# Patient Record
Sex: MALE | Race: WHITE | NOT HISPANIC OR LATINO | Employment: UNEMPLOYED | URBAN - METROPOLITAN AREA
[De-identification: names, ages, dates, MRNs, and addresses within clinical notes are randomized per-mention and may not be internally consistent; named-entity substitution may affect disease eponyms.]

---

## 2022-04-03 ENCOUNTER — HOSPITAL ENCOUNTER (EMERGENCY)
Facility: HOSPITAL | Age: 52
Discharge: HOME | End: 2022-04-03
Attending: FAMILY MEDICINE

## 2022-04-03 VITALS
SYSTOLIC BLOOD PRESSURE: 158 MMHG | OXYGEN SATURATION: 100 % | HEART RATE: 71 BPM | HEIGHT: 60 IN | TEMPERATURE: 98 F | BODY MASS INDEX: 36.91 KG/M2 | WEIGHT: 188 LBS | DIASTOLIC BLOOD PRESSURE: 108 MMHG | RESPIRATION RATE: 18 BRPM

## 2022-04-03 DIAGNOSIS — N20.0 KIDNEY STONE: Primary | ICD-10-CM

## 2022-04-03 DIAGNOSIS — N13.30 HYDRONEPHROSIS, UNSPECIFIED HYDRONEPHROSIS TYPE: ICD-10-CM

## 2022-04-03 DIAGNOSIS — N13.4 HYDROURETER: ICD-10-CM

## 2022-04-03 LAB
BILIRUB UR QL STRIP: NEGATIVE
CLARITY UR: ABNORMAL
COLOR UR: YELLOW
GLUCOSE UR QL STRIP: NEGATIVE
HGB UR QL STRIP: ABNORMAL
KETONES UR QL STRIP: NEGATIVE
LEUKOCYTE ESTERASE UR QL STRIP: NEGATIVE
MICROSCOPIC COMMENT: ABNORMAL
NITRITE UR QL STRIP: NEGATIVE
PH UR STRIP: >8 [PH] (ref 5–8)
PROT UR QL STRIP: ABNORMAL
RBC #/AREA URNS HPF: >100 /HPF (ref 0–4)
SP GR UR STRIP: 1.02 (ref 1–1.03)
URN SPEC COLLECT METH UR: ABNORMAL
UROBILINOGEN UR STRIP-ACNC: 1 EU/DL

## 2022-04-03 PROCEDURE — 99285 EMERGENCY DEPT VISIT HI MDM: CPT | Mod: 25

## 2022-04-03 PROCEDURE — 74176 CT ABD & PELVIS W/O CONTRAST: CPT | Mod: TC

## 2022-04-03 PROCEDURE — 74176 CT ABD & PELVIS W/O CONTRAST: CPT | Mod: 26,,, | Performed by: RADIOLOGY

## 2022-04-03 PROCEDURE — 81000 URINALYSIS NONAUTO W/SCOPE: CPT | Performed by: NURSE PRACTITIONER

## 2022-04-03 PROCEDURE — 63600175 PHARM REV CODE 636 W HCPCS: Performed by: NURSE PRACTITIONER

## 2022-04-03 PROCEDURE — 25000003 PHARM REV CODE 250: Performed by: NURSE PRACTITIONER

## 2022-04-03 PROCEDURE — 96372 THER/PROPH/DIAG INJ SC/IM: CPT | Performed by: NURSE PRACTITIONER

## 2022-04-03 PROCEDURE — 74176 CT RENAL STONE STUDY ABD PELVIS WO: ICD-10-PCS | Mod: 26,,, | Performed by: RADIOLOGY

## 2022-04-03 RX ORDER — HYDROCODONE BITARTRATE AND ACETAMINOPHEN 10; 325 MG/1; MG/1
1 TABLET ORAL
Status: COMPLETED | OUTPATIENT
Start: 2022-04-03 | End: 2022-04-03

## 2022-04-03 RX ORDER — TAMSULOSIN HYDROCHLORIDE 0.4 MG/1
0.4 CAPSULE ORAL DAILY
Qty: 10 CAPSULE | Refills: 0 | Status: SHIPPED | OUTPATIENT
Start: 2022-04-03

## 2022-04-03 RX ORDER — HYDROCODONE BITARTRATE AND ACETAMINOPHEN 10; 325 MG/1; MG/1
1 TABLET ORAL EVERY 8 HOURS PRN
Qty: 10 TABLET | Refills: 0 | Status: SHIPPED | OUTPATIENT
Start: 2022-04-03

## 2022-04-03 RX ORDER — CIPROFLOXACIN 500 MG/1
500 TABLET ORAL 2 TIMES DAILY
Qty: 20 TABLET | Refills: 0 | Status: SHIPPED | OUTPATIENT
Start: 2022-04-03 | End: 2022-04-13

## 2022-04-03 RX ORDER — KETOROLAC TROMETHAMINE 30 MG/ML
60 INJECTION, SOLUTION INTRAMUSCULAR; INTRAVENOUS
Status: COMPLETED | OUTPATIENT
Start: 2022-04-03 | End: 2022-04-03

## 2022-04-03 RX ADMIN — KETOROLAC TROMETHAMINE 60 MG: 30 INJECTION, SOLUTION INTRAMUSCULAR at 12:04

## 2022-04-03 RX ADMIN — HYDROCODONE BITARTRATE AND ACETAMINOPHEN 1 TABLET: 10; 325 TABLET ORAL at 01:04

## 2022-04-03 NOTE — ED PROVIDER NOTES
Encounter Date: 4/3/2022       History     Chief Complaint   Patient presents with    Back Pain     Left flank pain states that he has a kidney stone     Comes in compaining of left flank pain.  has a history of kidney stones.  has been in this hospital in the past for kidney stones. Pain is rated as 10/10 currently        Review of patient's allergies indicates:   Allergen Reactions    Penicillins Swelling     Past Medical History:   Diagnosis Date    Renal disorder      History reviewed. No pertinent surgical history.  History reviewed. No pertinent family history.  Social History     Tobacco Use    Smoking status: Current Every Day Smoker    Smokeless tobacco: Never Used   Substance Use Topics    Alcohol use: Not Currently    Drug use: Not Currently     Types: Cocaine, Methamphetamines     Review of Systems   Constitutional: Negative for fever.   HENT: Negative for sore throat.    Respiratory: Negative for shortness of breath.    Cardiovascular: Negative for chest pain.   Gastrointestinal: Negative for nausea.   Genitourinary: Positive for flank pain. Negative for dysuria.   Musculoskeletal: Negative for back pain.   Skin: Negative for rash.   Neurological: Negative for weakness.   Hematological: Does not bruise/bleed easily.       Physical Exam     Initial Vitals [04/03/22 1144]   BP Pulse Resp Temp SpO2   (!) 158/108 71 18 97.8 °F (36.6 °C) 100 %      MAP       --         Physical Exam    Nursing note and vitals reviewed.  Constitutional: He appears well-developed and well-nourished.   HENT:   Head: Normocephalic and atraumatic.   Eyes: EOM are normal.   Neck: Neck supple.   Normal range of motion.  Cardiovascular: Normal rate and regular rhythm.   Pulmonary/Chest: Breath sounds normal. He has no wheezes. He has no rhonchi.   Abdominal: Abdomen is soft. There is no abdominal tenderness.   Musculoskeletal:         General: Normal range of motion.      Cervical back: Normal range of motion and  neck supple.     Lymphadenopathy:     He has no cervical adenopathy.   Neurological: He is alert and oriented to person, place, and time.   Skin: Skin is warm and dry. Capillary refill takes less than 2 seconds.   Psychiatric: He has a normal mood and affect. Thought content normal.         ED Course   Procedures  Labs Reviewed   URINALYSIS, REFLEX TO URINE CULTURE - Abnormal; Notable for the following components:       Result Value    Appearance, UA Hazy (*)     pH, UA >8.0 (*)     Protein, UA Trace (*)     Occult Blood UA 3+ (*)     All other components within normal limits    Narrative:     Preferred Collection Type->Urine, Clean Catch  Specimen Source->Urine   URINALYSIS MICROSCOPIC - Abnormal; Notable for the following components:    RBC, UA >100 (*)     All other components within normal limits    Narrative:     Preferred Collection Type->Urine, Clean Catch  Specimen Source->Urine          Imaging Results          CT Renal Stone Study ABD Pelvis WO (Final result)  Result time 04/03/22 12:56:27    Final result by Pili Ferrell MD (04/03/22 12:56:27)                 Impression:      1. 7 mm distal left ureteral stone with associated diffuse left hydroureter and moderate left hydronephrosis.  2. bilateral nephrolithiasis  3. Diffuse bladder wall thickening which could reflect underlying cystitis.  4. Other findings as detailed above      Electronically signed by: Mahesh Ferrell MD  Date:    04/03/2022  Time:    12:56             Narrative:    EXAMINATION:  CT RENAL STONE STUDY ABD PELVIS WO    CLINICAL HISTORY:  Flank pain, kidney stone suspected;    TECHNIQUE:  3 mm contiguous low dose axial images were acquired through the abdomen and pelvis utilizing the CT renal stone study protocol.  No oral contrast material was administered.    COMPARISON:  CT renal stone study-05/25/2016.    FINDINGS:  Kidneys, ureters, and bladder: There is a 7 mm distal left ureteral stone with associated diffuse left hydroureter  and moderate left hydronephrosis.  There is asymmetric enlargement of the left kidney and perinephric fat stranding due to inflammatory change of the left kidney.  There is bilateral nephrolithiasis present.  The largest stone in the left kidney measures 7 mm in the left renal midpole on series 2, image 62.  The largest stone in the right kidney measures 5 mm in the lower pole of the right kidney on series 2, image 59.  no definite solid mass within either kidney.  No right hydronephrosis.  The right ureter is normal in caliber and course without stones.  There is diffuse bladder wall thickening present suggesting possible cystitis.  The prostate gland is normal in size.  There is a tiny fat containing left inguinal hernia.    Lung bases and mediastinum: The visualized lung bases are unremarkable.  There is a small hiatal hernia present..    Remaining soft tissues of the abdomen and pelvis: The liver, gallbladder, duodenal C-loop, pancreas, and adrenal glands are unremarkable.  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  There is atherosclerotic calcification present within the abdominal aorta.  No abdominal aortic aneurysm.  No bulky periaortic or retroperitoneal lymphadenopathy.    The appendix is not definitively identified.  No inflammatory changes noted in the vicinity of the cecum.  There is a suggestion of possible concentric rectal wall thickening (series 2, image 144).  This may be due to nondistention and appears similar in extent when compared with 05/25/2016.  No definite bowel inflammatory change or high-grade bowel obstruction.  No ascites or pneumoperitoneum.  No intra-abdominal abscess formation.  No inguinal lymphadenopathy.    Bones: There is multilevel degenerative change of the lumbar spine in the form of marginal osteophyte formation, disc space narrowing, and degenerative facet  arthropathy.  There is chondrocalcinosis present in both hips compatible with underlying CPPD.  There is rim osteophyte formation about the left and right femoral neck and there is moderate-severe hip joint space narrowing noted bilaterally.  There are bone islands present in both femoral heads.  No osteonecrosis of the femoral heads..                                 Medications   ketorolac injection 60 mg (60 mg Intramuscular Given 4/3/22 1236)   HYDROcodone-acetaminophen  mg per tablet 1 tablet (1 tablet Oral Given 4/3/22 1343)                 ED Course as of 04/03/22 1353   Sun Apr 03, 2022   1315 Discussed with Dr Leung [NP]      ED Course User Index  [NP] SEBASTIAN Armando             Clinical Impression:   Final diagnoses:  [N20.0] Kidney stone (Primary)  [N13.4] Hydroureter  [N13.30] Hydronephrosis, unspecified hydronephrosis type          ED Disposition Condition    Discharge Good        ED Prescriptions     Medication Sig Dispense Start Date End Date Auth. Provider    tamsulosin (FLOMAX) 0.4 mg Cap Take 1 capsule (0.4 mg total) by mouth once daily. 10 capsule 4/3/2022  SEBASTIAN Armando    HYDROcodone-acetaminophen (NORCO)  mg per tablet Take 1 tablet by mouth every 8 (eight) hours as needed for Pain. 10 tablet 4/3/2022  SEBASTIAN Armando    ciprofloxacin HCl (CIPRO) 500 MG tablet Take 1 tablet (500 mg total) by mouth 2 (two) times daily. for 10 days 20 tablet 4/3/2022 4/13/2022 SEBASTIAN Armando        Follow-up Information     Follow up With Specialties Details Why Contact Info    PCP   Schedule appointment            SEBASTIAN Armando  04/03/22 1902

## 2024-01-25 ENCOUNTER — HOSPITAL ENCOUNTER (INPATIENT)
Facility: HOSPITAL | Age: 54
LOS: 1 days | Discharge: ANOTHER HOSPITAL | DRG: 291 | End: 2024-01-27
Attending: EMERGENCY MEDICINE | Admitting: STUDENT IN AN ORGANIZED HEALTH CARE EDUCATION/TRAINING PROGRAM
Payer: COMMERCIAL

## 2024-01-25 DIAGNOSIS — R06.02 SOB (SHORTNESS OF BREATH): ICD-10-CM

## 2024-01-25 DIAGNOSIS — I21.4 NSTEMI (NON-ST ELEVATED MYOCARDIAL INFARCTION): ICD-10-CM

## 2024-01-25 DIAGNOSIS — I50.9 ACUTE CONGESTIVE HEART FAILURE, UNSPECIFIED HEART FAILURE TYPE: Primary | ICD-10-CM

## 2024-01-25 DIAGNOSIS — R06.02 SHORTNESS OF BREATH: ICD-10-CM

## 2024-01-25 PROBLEM — E87.70 VOLUME OVERLOAD: Status: ACTIVE | Noted: 2024-01-25

## 2024-01-25 LAB
ALBUMIN SERPL BCP-MCNC: 3.9 G/DL (ref 3.5–5.2)
ALP SERPL-CCNC: 104 U/L (ref 55–135)
ALT SERPL W/O P-5'-P-CCNC: 22 U/L (ref 10–44)
AMPHET+METHAMPHET UR QL: ABNORMAL
ANION GAP SERPL CALC-SCNC: 11 MMOL/L (ref 8–16)
AST SERPL-CCNC: 24 U/L (ref 10–40)
BACTERIA #/AREA URNS HPF: ABNORMAL /HPF
BARBITURATES UR QL SCN>200 NG/ML: NEGATIVE
BASOPHILS # BLD AUTO: 0.16 K/UL (ref 0–0.2)
BASOPHILS NFR BLD: 1.7 % (ref 0–1.9)
BENZODIAZ UR QL SCN>200 NG/ML: NEGATIVE
BILIRUB SERPL-MCNC: 0.8 MG/DL (ref 0.1–1)
BILIRUB UR QL STRIP: NEGATIVE
BNP SERPL-MCNC: 1854 PG/ML (ref 0–99)
BNP SERPL-MCNC: 1854 PG/ML (ref 0–99)
BUN SERPL-MCNC: 13 MG/DL (ref 6–20)
BZE UR QL SCN: NEGATIVE
CALCIUM SERPL-MCNC: 9.2 MG/DL (ref 8.7–10.5)
CANNABINOIDS UR QL SCN: NEGATIVE
CHLORIDE SERPL-SCNC: 102 MMOL/L (ref 95–110)
CLARITY UR: CLEAR
CO2 SERPL-SCNC: 24 MMOL/L (ref 23–29)
COLOR UR: YELLOW
CREAT SERPL-MCNC: 1.1 MG/DL (ref 0.5–1.4)
CREAT UR-MCNC: 135.1 MG/DL (ref 23–375)
D DIMER PPP IA.FEU-MCNC: 0.83 MG/L FEU
DIFFERENTIAL METHOD BLD: NORMAL
EOSINOPHIL # BLD AUTO: 0.5 K/UL (ref 0–0.5)
EOSINOPHIL NFR BLD: 5.1 % (ref 0–8)
ERYTHROCYTE [DISTWIDTH] IN BLOOD BY AUTOMATED COUNT: 14.1 % (ref 11.5–14.5)
EST. GFR  (NO RACE VARIABLE): >60 ML/MIN/1.73 M^2
GLUCOSE SERPL-MCNC: 157 MG/DL (ref 70–110)
GLUCOSE UR QL STRIP: NEGATIVE
HCT VFR BLD AUTO: 49.2 % (ref 40–54)
HCV AB SERPL QL IA: REACTIVE
HGB BLD-MCNC: 17 G/DL (ref 14–18)
HGB UR QL STRIP: ABNORMAL
HIV 1+2 AB+HIV1 P24 AG SERPL QL IA: NORMAL
HYALINE CASTS #/AREA URNS LPF: 0 /LPF
IMM GRANULOCYTES # BLD AUTO: 0.03 K/UL (ref 0–0.04)
IMM GRANULOCYTES NFR BLD AUTO: 0.3 % (ref 0–0.5)
INFLUENZA A, MOLECULAR: NEGATIVE
INFLUENZA B, MOLECULAR: NEGATIVE
KETONES UR QL STRIP: NEGATIVE
LACTATE SERPL-SCNC: 1.9 MMOL/L (ref 0.5–2.2)
LEUKOCYTE ESTERASE UR QL STRIP: NEGATIVE
LYMPHOCYTES # BLD AUTO: 2.4 K/UL (ref 1–4.8)
LYMPHOCYTES NFR BLD: 24.6 % (ref 18–48)
MCH RBC QN AUTO: 30.3 PG (ref 27–31)
MCHC RBC AUTO-ENTMCNC: 34.6 G/DL (ref 32–36)
MCV RBC AUTO: 88 FL (ref 82–98)
METHADONE UR QL SCN>300 NG/ML: NEGATIVE
MICROSCOPIC COMMENT: ABNORMAL
MONOCYTES # BLD AUTO: 0.8 K/UL (ref 0.3–1)
MONOCYTES NFR BLD: 8 % (ref 4–15)
NEUTROPHILS # BLD AUTO: 5.9 K/UL (ref 1.8–7.7)
NEUTROPHILS NFR BLD: 60.3 % (ref 38–73)
NITRITE UR QL STRIP: NEGATIVE
NRBC BLD-RTO: 0 /100 WBC
OPIATES UR QL SCN: NEGATIVE
PCP UR QL SCN>25 NG/ML: NEGATIVE
PH UR STRIP: 6 [PH] (ref 5–8)
PLATELET # BLD AUTO: 344 K/UL (ref 150–450)
PMV BLD AUTO: 9.3 FL (ref 9.2–12.9)
POTASSIUM SERPL-SCNC: 4.6 MMOL/L (ref 3.5–5.1)
PROT SERPL-MCNC: 7.7 G/DL (ref 6–8.4)
PROT UR QL STRIP: ABNORMAL
RBC # BLD AUTO: 5.61 M/UL (ref 4.6–6.2)
RBC #/AREA URNS HPF: 10 /HPF (ref 0–4)
SARS-COV-2 RDRP RESP QL NAA+PROBE: NEGATIVE
SODIUM SERPL-SCNC: 137 MMOL/L (ref 136–145)
SP GR UR STRIP: 1.02 (ref 1–1.03)
SPECIMEN SOURCE: NORMAL
SQUAMOUS #/AREA URNS HPF: 1 /HPF
TOXICOLOGY INFORMATION: ABNORMAL
TROPONIN I SERPL DL<=0.01 NG/ML-MCNC: 0.17 NG/ML (ref 0–0.03)
TROPONIN I SERPL DL<=0.01 NG/ML-MCNC: 0.19 NG/ML (ref 0–0.03)
TROPONIN I SERPL DL<=0.01 NG/ML-MCNC: 0.21 NG/ML (ref 0–0.03)
URN SPEC COLLECT METH UR: ABNORMAL
UROBILINOGEN UR STRIP-ACNC: 1 EU/DL
WBC # BLD AUTO: 9.68 K/UL (ref 3.9–12.7)
WBC #/AREA URNS HPF: 2 /HPF (ref 0–5)

## 2024-01-25 PROCEDURE — 81000 URINALYSIS NONAUTO W/SCOPE: CPT | Mod: 59 | Performed by: EMERGENCY MEDICINE

## 2024-01-25 PROCEDURE — G0378 HOSPITAL OBSERVATION PER HR: HCPCS

## 2024-01-25 PROCEDURE — U0002 COVID-19 LAB TEST NON-CDC: HCPCS | Performed by: EMERGENCY MEDICINE

## 2024-01-25 PROCEDURE — 25000003 PHARM REV CODE 250: Performed by: EMERGENCY MEDICINE

## 2024-01-25 PROCEDURE — 93010 ELECTROCARDIOGRAM REPORT: CPT | Mod: ,,, | Performed by: INTERNAL MEDICINE

## 2024-01-25 PROCEDURE — 80053 COMPREHEN METABOLIC PANEL: CPT | Performed by: EMERGENCY MEDICINE

## 2024-01-25 PROCEDURE — 99285 EMERGENCY DEPT VISIT HI MDM: CPT | Mod: 25

## 2024-01-25 PROCEDURE — 71275 CT ANGIOGRAPHY CHEST: CPT | Mod: TC

## 2024-01-25 PROCEDURE — 99223 1ST HOSP IP/OBS HIGH 75: CPT | Mod: GT,,, | Performed by: HOSPITALIST

## 2024-01-25 PROCEDURE — 96374 THER/PROPH/DIAG INJ IV PUSH: CPT | Mod: 59

## 2024-01-25 PROCEDURE — 85025 COMPLETE CBC W/AUTO DIFF WBC: CPT | Performed by: EMERGENCY MEDICINE

## 2024-01-25 PROCEDURE — 71045 X-RAY EXAM CHEST 1 VIEW: CPT | Mod: TC

## 2024-01-25 PROCEDURE — 93005 ELECTROCARDIOGRAM TRACING: CPT

## 2024-01-25 PROCEDURE — 96372 THER/PROPH/DIAG INJ SC/IM: CPT | Mod: 59 | Performed by: EMERGENCY MEDICINE

## 2024-01-25 PROCEDURE — 71275 CT ANGIOGRAPHY CHEST: CPT | Mod: 26,,, | Performed by: RADIOLOGY

## 2024-01-25 PROCEDURE — 84484 ASSAY OF TROPONIN QUANT: CPT | Mod: 91 | Performed by: EMERGENCY MEDICINE

## 2024-01-25 PROCEDURE — 83605 ASSAY OF LACTIC ACID: CPT | Performed by: EMERGENCY MEDICINE

## 2024-01-25 PROCEDURE — 63600175 PHARM REV CODE 636 W HCPCS: Performed by: EMERGENCY MEDICINE

## 2024-01-25 PROCEDURE — 25500020 PHARM REV CODE 255: Performed by: EMERGENCY MEDICINE

## 2024-01-25 PROCEDURE — 86803 HEPATITIS C AB TEST: CPT | Performed by: EMERGENCY MEDICINE

## 2024-01-25 PROCEDURE — 87502 INFLUENZA DNA AMP PROBE: CPT | Performed by: EMERGENCY MEDICINE

## 2024-01-25 PROCEDURE — 87389 HIV-1 AG W/HIV-1&-2 AB AG IA: CPT | Performed by: EMERGENCY MEDICINE

## 2024-01-25 PROCEDURE — 93010 ELECTROCARDIOGRAM REPORT: CPT | Mod: 76,,, | Performed by: INTERNAL MEDICINE

## 2024-01-25 PROCEDURE — 84484 ASSAY OF TROPONIN QUANT: CPT | Performed by: EMERGENCY MEDICINE

## 2024-01-25 PROCEDURE — 71045 X-RAY EXAM CHEST 1 VIEW: CPT | Mod: 26,,, | Performed by: RADIOLOGY

## 2024-01-25 PROCEDURE — 80307 DRUG TEST PRSMV CHEM ANLYZR: CPT | Performed by: EMERGENCY MEDICINE

## 2024-01-25 PROCEDURE — 83880 ASSAY OF NATRIURETIC PEPTIDE: CPT | Performed by: EMERGENCY MEDICINE

## 2024-01-25 RX ORDER — IPRATROPIUM BROMIDE AND ALBUTEROL SULFATE 2.5; .5 MG/3ML; MG/3ML
3 SOLUTION RESPIRATORY (INHALATION) EVERY 4 HOURS PRN
Status: DISCONTINUED | OUTPATIENT
Start: 2024-01-25 | End: 2024-01-27 | Stop reason: HOSPADM

## 2024-01-25 RX ORDER — FUROSEMIDE 10 MG/ML
40 INJECTION INTRAMUSCULAR; INTRAVENOUS 2 TIMES DAILY
Status: DISCONTINUED | OUTPATIENT
Start: 2024-01-26 | End: 2024-01-27 | Stop reason: HOSPADM

## 2024-01-25 RX ORDER — FUROSEMIDE 10 MG/ML
60 INJECTION INTRAMUSCULAR; INTRAVENOUS
Status: COMPLETED | OUTPATIENT
Start: 2024-01-25 | End: 2024-01-25

## 2024-01-25 RX ORDER — LISINOPRIL 10 MG/1
10 TABLET ORAL
Status: COMPLETED | OUTPATIENT
Start: 2024-01-25 | End: 2024-01-25

## 2024-01-25 RX ORDER — ENOXAPARIN SODIUM 100 MG/ML
1 INJECTION SUBCUTANEOUS
Status: COMPLETED | OUTPATIENT
Start: 2024-01-25 | End: 2024-01-25

## 2024-01-25 RX ORDER — ENOXAPARIN SODIUM 100 MG/ML
40 INJECTION SUBCUTANEOUS EVERY 24 HOURS
Status: DISCONTINUED | OUTPATIENT
Start: 2024-01-25 | End: 2024-01-26

## 2024-01-25 RX ORDER — CARVEDILOL 3.12 MG/1
3.12 TABLET ORAL 2 TIMES DAILY
Status: DISCONTINUED | OUTPATIENT
Start: 2024-01-25 | End: 2024-01-27 | Stop reason: HOSPADM

## 2024-01-25 RX ORDER — SODIUM CHLORIDE 0.9 % (FLUSH) 0.9 %
10 SYRINGE (ML) INJECTION
Status: DISCONTINUED | OUTPATIENT
Start: 2024-01-25 | End: 2024-01-27 | Stop reason: HOSPADM

## 2024-01-25 RX ORDER — ASPIRIN 325 MG
325 TABLET ORAL
Status: DISPENSED | OUTPATIENT
Start: 2024-01-25 | End: 2024-01-26

## 2024-01-25 RX ADMIN — LISINOPRIL 10 MG: 10 TABLET ORAL at 04:01

## 2024-01-25 RX ADMIN — ENOXAPARIN SODIUM 90 MG: 100 INJECTION SUBCUTANEOUS at 04:01

## 2024-01-25 RX ADMIN — CARVEDILOL 3.12 MG: 3.12 TABLET, FILM COATED ORAL at 08:01

## 2024-01-25 RX ADMIN — IOHEXOL 100 ML: 350 INJECTION, SOLUTION INTRAVENOUS at 05:01

## 2024-01-25 RX ADMIN — NITROGLYCERIN 1 INCH: 20 OINTMENT TOPICAL at 04:01

## 2024-01-25 RX ADMIN — FUROSEMIDE 60 MG: 10 INJECTION, SOLUTION INTRAVENOUS at 04:01

## 2024-01-26 PROBLEM — I20.0 UNSTABLE ANGINA: Status: ACTIVE | Noted: 2024-01-26

## 2024-01-26 PROBLEM — Z72.0 TOBACCO ABUSE: Status: ACTIVE | Noted: 2024-01-26

## 2024-01-26 PROBLEM — F15.10 METHAMPHETAMINE USE: Status: ACTIVE | Noted: 2024-01-26

## 2024-01-26 PROBLEM — I10 HYPERTENSION: Status: ACTIVE | Noted: 2024-01-26

## 2024-01-26 PROBLEM — J44.9 COPD (CHRONIC OBSTRUCTIVE PULMONARY DISEASE): Status: ACTIVE | Noted: 2024-01-26

## 2024-01-26 PROBLEM — I50.9 ACUTE HEART FAILURE: Status: ACTIVE | Noted: 2024-01-26

## 2024-01-26 PROBLEM — E87.70 VOLUME OVERLOAD: Status: RESOLVED | Noted: 2024-01-25 | Resolved: 2024-01-26

## 2024-01-26 LAB
ANION GAP SERPL CALC-SCNC: 12 MMOL/L (ref 8–16)
BASOPHILS # BLD AUTO: 0.15 K/UL (ref 0–0.2)
BASOPHILS NFR BLD: 1.5 % (ref 0–1.9)
BUN SERPL-MCNC: 13 MG/DL (ref 6–20)
CALCIUM SERPL-MCNC: 8.8 MG/DL (ref 8.7–10.5)
CHLORIDE SERPL-SCNC: 105 MMOL/L (ref 95–110)
CO2 SERPL-SCNC: 22 MMOL/L (ref 23–29)
CREAT SERPL-MCNC: 0.9 MG/DL (ref 0.5–1.4)
DIFFERENTIAL METHOD BLD: ABNORMAL
EOSINOPHIL # BLD AUTO: 0.7 K/UL (ref 0–0.5)
EOSINOPHIL NFR BLD: 7.3 % (ref 0–8)
ERYTHROCYTE [DISTWIDTH] IN BLOOD BY AUTOMATED COUNT: 13.9 % (ref 11.5–14.5)
EST. GFR  (NO RACE VARIABLE): >60 ML/MIN/1.73 M^2
ESTIMATED AVG GLUCOSE: 108 MG/DL (ref 68–131)
GLUCOSE SERPL-MCNC: 99 MG/DL (ref 70–110)
HBA1C MFR BLD: 5.4 % (ref 4–5.6)
HCT VFR BLD AUTO: 45.7 % (ref 40–54)
HGB BLD-MCNC: 16.2 G/DL (ref 14–18)
IMM GRANULOCYTES # BLD AUTO: 0.01 K/UL (ref 0–0.04)
IMM GRANULOCYTES NFR BLD AUTO: 0.1 % (ref 0–0.5)
LYMPHOCYTES # BLD AUTO: 2.5 K/UL (ref 1–4.8)
LYMPHOCYTES NFR BLD: 25.5 % (ref 18–48)
MAGNESIUM SERPL-MCNC: 1.7 MG/DL (ref 1.6–2.6)
MCH RBC QN AUTO: 30.4 PG (ref 27–31)
MCHC RBC AUTO-ENTMCNC: 35.4 G/DL (ref 32–36)
MCV RBC AUTO: 86 FL (ref 82–98)
MONOCYTES # BLD AUTO: 0.9 K/UL (ref 0.3–1)
MONOCYTES NFR BLD: 9.3 % (ref 4–15)
NEUTROPHILS # BLD AUTO: 5.5 K/UL (ref 1.8–7.7)
NEUTROPHILS NFR BLD: 56.3 % (ref 38–73)
NRBC BLD-RTO: 0 /100 WBC
PHOSPHATE SERPL-MCNC: 4.3 MG/DL (ref 2.7–4.5)
PLATELET # BLD AUTO: 366 K/UL (ref 150–450)
PMV BLD AUTO: 9.5 FL (ref 9.2–12.9)
POTASSIUM SERPL-SCNC: 3.9 MMOL/L (ref 3.5–5.1)
RBC # BLD AUTO: 5.33 M/UL (ref 4.6–6.2)
SODIUM SERPL-SCNC: 139 MMOL/L (ref 136–145)
TROPONIN I SERPL DL<=0.01 NG/ML-MCNC: 0.19 NG/ML (ref 0–0.03)
WBC # BLD AUTO: 9.69 K/UL (ref 3.9–12.7)

## 2024-01-26 PROCEDURE — 25000003 PHARM REV CODE 250: Performed by: EMERGENCY MEDICINE

## 2024-01-26 PROCEDURE — 99900035 HC TECH TIME PER 15 MIN (STAT)

## 2024-01-26 PROCEDURE — 84484 ASSAY OF TROPONIN QUANT: CPT | Performed by: HOSPITALIST

## 2024-01-26 PROCEDURE — 99233 SBSQ HOSP IP/OBS HIGH 50: CPT | Mod: ,,, | Performed by: STUDENT IN AN ORGANIZED HEALTH CARE EDUCATION/TRAINING PROGRAM

## 2024-01-26 PROCEDURE — 83735 ASSAY OF MAGNESIUM: CPT | Performed by: HOSPITALIST

## 2024-01-26 PROCEDURE — 25000242 PHARM REV CODE 250 ALT 637 W/ HCPCS: Performed by: HOSPITALIST

## 2024-01-26 PROCEDURE — 25000003 PHARM REV CODE 250: Performed by: STUDENT IN AN ORGANIZED HEALTH CARE EDUCATION/TRAINING PROGRAM

## 2024-01-26 PROCEDURE — 94761 N-INVAS EAR/PLS OXIMETRY MLT: CPT

## 2024-01-26 PROCEDURE — 94640 AIRWAY INHALATION TREATMENT: CPT

## 2024-01-26 PROCEDURE — 96365 THER/PROPH/DIAG IV INF INIT: CPT

## 2024-01-26 PROCEDURE — 63600175 PHARM REV CODE 636 W HCPCS: Performed by: STUDENT IN AN ORGANIZED HEALTH CARE EDUCATION/TRAINING PROGRAM

## 2024-01-26 PROCEDURE — 96376 TX/PRO/DX INJ SAME DRUG ADON: CPT

## 2024-01-26 PROCEDURE — 80048 BASIC METABOLIC PNL TOTAL CA: CPT | Performed by: HOSPITALIST

## 2024-01-26 PROCEDURE — 93010 ELECTROCARDIOGRAM REPORT: CPT | Mod: ,,, | Performed by: INTERNAL MEDICINE

## 2024-01-26 PROCEDURE — 84100 ASSAY OF PHOSPHORUS: CPT | Performed by: HOSPITALIST

## 2024-01-26 PROCEDURE — 93005 ELECTROCARDIOGRAM TRACING: CPT

## 2024-01-26 PROCEDURE — 85025 COMPLETE CBC W/AUTO DIFF WBC: CPT | Performed by: HOSPITALIST

## 2024-01-26 PROCEDURE — 63600175 PHARM REV CODE 636 W HCPCS: Performed by: HOSPITALIST

## 2024-01-26 PROCEDURE — 83036 HEMOGLOBIN GLYCOSYLATED A1C: CPT | Performed by: HOSPITALIST

## 2024-01-26 PROCEDURE — 11000001 HC ACUTE MED/SURG PRIVATE ROOM

## 2024-01-26 RX ORDER — LISINOPRIL 2.5 MG/1
2.5 TABLET ORAL DAILY
Status: DISCONTINUED | OUTPATIENT
Start: 2024-01-26 | End: 2024-01-27 | Stop reason: HOSPADM

## 2024-01-26 RX ORDER — MAGNESIUM SULFATE 1 G/100ML
1 INJECTION INTRAVENOUS ONCE
Status: COMPLETED | OUTPATIENT
Start: 2024-01-26 | End: 2024-01-26

## 2024-01-26 RX ORDER — NAPROXEN SODIUM 220 MG/1
81 TABLET, FILM COATED ORAL DAILY
Status: DISCONTINUED | OUTPATIENT
Start: 2024-01-26 | End: 2024-01-27 | Stop reason: HOSPADM

## 2024-01-26 RX ORDER — POTASSIUM CHLORIDE 20 MEQ/1
40 TABLET, EXTENDED RELEASE ORAL ONCE
Status: COMPLETED | OUTPATIENT
Start: 2024-01-26 | End: 2024-01-26

## 2024-01-26 RX ORDER — ENOXAPARIN SODIUM 100 MG/ML
1 INJECTION SUBCUTANEOUS EVERY 12 HOURS
Status: DISCONTINUED | OUTPATIENT
Start: 2024-01-26 | End: 2024-01-27 | Stop reason: HOSPADM

## 2024-01-26 RX ADMIN — MAGNESIUM SULFATE IN DEXTROSE 1 G: 10 INJECTION, SOLUTION INTRAVENOUS at 08:01

## 2024-01-26 RX ADMIN — ASPIRIN 81 MG: 81 TABLET, CHEWABLE ORAL at 08:01

## 2024-01-26 RX ADMIN — POTASSIUM CHLORIDE 40 MEQ: 1500 TABLET, EXTENDED RELEASE ORAL at 08:01

## 2024-01-26 RX ADMIN — LISINOPRIL 2.5 MG: 2.5 TABLET ORAL at 08:01

## 2024-01-26 RX ADMIN — FUROSEMIDE 40 MG: 10 INJECTION, SOLUTION INTRAVENOUS at 04:01

## 2024-01-26 RX ADMIN — IPRATROPIUM BROMIDE AND ALBUTEROL SULFATE 3 ML: .5; 2.5 SOLUTION RESPIRATORY (INHALATION) at 10:01

## 2024-01-26 RX ADMIN — ENOXAPARIN SODIUM 80 MG: 80 INJECTION SUBCUTANEOUS at 09:01

## 2024-01-26 RX ADMIN — CARVEDILOL 3.12 MG: 3.12 TABLET, FILM COATED ORAL at 09:01

## 2024-01-26 RX ADMIN — CARVEDILOL 3.12 MG: 3.12 TABLET, FILM COATED ORAL at 08:01

## 2024-01-26 RX ADMIN — FUROSEMIDE 40 MG: 10 INJECTION, SOLUTION INTRAVENOUS at 09:01

## 2024-01-26 RX ADMIN — ENOXAPARIN SODIUM 80 MG: 80 INJECTION SUBCUTANEOUS at 10:01

## 2024-01-26 NOTE — HPI
The patient is a 52 y/o male with no known PMH who presents with SOB. SOB has been present over the past week and has been worsening. He has BEAUCHAMP and orthopnea. No known history of lung or hear problems. No swelling of the extremities or abdomen, CP, nausea, vomiting or other symptoms. Work up in the ER suggests new CHF. He reports feeling improved after being given lasix and nebs.

## 2024-01-26 NOTE — PLAN OF CARE
Problem: Adult Inpatient Plan of Care  Goal: Plan of Care Review  1/26/2024 0050 by Jodi Fermin RN  Outcome: Ongoing, Progressing  1/26/2024 0048 by Jodi Fermin RN  Outcome: Ongoing, Progressing  Goal: Patient-Specific Goal (Individualized)  1/26/2024 0050 by Jodi Fermin RN  Outcome: Ongoing, Progressing  1/26/2024 0048 by Jodi Fermin RN  Outcome: Ongoing, Progressing  Goal: Absence of Hospital-Acquired Illness or Injury  1/26/2024 0050 by Jodi Fermin RN  Outcome: Ongoing, Progressing  1/26/2024 0048 by Jodi Fermin RN  Outcome: Ongoing, Progressing  Goal: Optimal Comfort and Wellbeing  1/26/2024 0050 by Jodi Fermin RN  Outcome: Ongoing, Progressing  1/26/2024 0048 by Jodi Fermin RN  Outcome: Ongoing, Progressing  Goal: Readiness for Transition of Care  1/26/2024 0050 by Jodi Fermin RN  Outcome: Ongoing, Progressing  1/26/2024 0048 by Jodi Fermin RN  Outcome: Ongoing, Progressing     Problem: Adjustment to Illness (Heart Failure)  Goal: Optimal Coping  Outcome: Ongoing, Progressing     Problem: Cardiac Output Decreased (Heart Failure)  Goal: Optimal Cardiac Output  Outcome: Ongoing, Progressing     Problem: Dysrhythmia (Heart Failure)  Goal: Stable Heart Rate and Rhythm  Outcome: Ongoing, Progressing     Problem: Fluid Imbalance (Heart Failure)  Goal: Fluid Balance  Outcome: Ongoing, Progressing     Problem: Functional Ability Impaired (Heart Failure)  Goal: Optimal Functional Ability  Outcome: Ongoing, Progressing     Problem: Oral Intake Inadequate (Heart Failure)  Goal: Optimal Nutrition Intake  Outcome: Ongoing, Progressing     Problem: Respiratory Compromise (Heart Failure)  Goal: Effective Oxygenation and Ventilation  Outcome: Ongoing, Progressing     Problem: Sleep Disordered Breathing (Heart Failure)  Goal: Effective Breathing Pattern During Sleep  Outcome: Ongoing, Progressing

## 2024-01-26 NOTE — PLAN OF CARE
[facility] Intensive Care  Initial Discharge Assessment    Assessment completed at bedside with patient, demographics verified.  Patient states he lives with his parents (only PO Box information provided) and is fully independent with ADL's.  Currently listed as self-pay; however, patient believes he is active with [employer] and has a card showing active as of 1/1/24, Nichol with registration updated and will run benefits to verify and update Epic.  Patient declined need for social resources and plans to return home at time of discharge.  Is open to establishing with a PCP depending on insurance verification and limitations.  Does not have Advanced Directives, advised to address when able.      Discharge Plan:  Transfer to [facility] for higher level of care and services  Discharge Needs:  PCP, insurance verification.  Post-acute service needs tbd, pending clinical course.     Primary Care Provider: No, Primary Doctor    Admission Diagnosis: Shortness of breath [R06.02]  SOB (shortness of breath) [R06.02]  NSTEMI (non-ST elevated myocardial infarction) [I21.4]  Acute congestive heart failure, unspecified heart failure type [I50.9]    Admission Date: 1/25/2024  Expected Discharge Date:     Transition of Care Barriers: Substance Abuse, Underinsured    Payor: /     Extended Emergency Contact Information  Primary Emergency Contact: Camryn Manuel  [place] Phone: [phone]  Relation: Mother  Preferred language: English   needed? No  Secondary Emergency Contact: Roberto Manuel  [place] Phone: [phone]  Relation: Father  Preferred language: English   needed? No    Discharge Plan A: Hospital Transfer  Discharge Plan B: Home with family      [id number] [place] [id number] AT [facility] [address]  [address] [address]  Phone: [phone] Fax: [phone]    [facility] [place] - [address]  [address]  [place]  [address]  Phone: [phone] Fax: [phone]      Initial Assessment (most recent)       Adult Discharge Assessment - 01/26/24 1136          Discharge Assessment    Assessment Type Discharge Planning Assessment     Confirmed/corrected address, phone number and insurance Yes   PO box    Confirmed Demographics Correct on Facesheet     Source of Information patient     People in Home parent(s)     Do you expect to return to your current living situation? Yes     Prior to hospitilization cognitive status: Alert/Oriented;No Deficits     Current cognitive status: Alert/Oriented;No Deficits     Walking or Climbing Stairs Difficulty no     Dressing/Bathing Difficulty no     Equipment Currently Used at Home none     Readmission within 30 days? No     Patient currently being followed by outpatient case management? No     Do you currently have service(s) that help you manage your care at home? No     Do you have prescription coverage? Yes     Coverage [employer]     How do you get to doctors appointments? car, drives self     Are you on dialysis? No     Do you take coumadin? No     Discharge Plan A Hospital Transfer     Discharge Plan B Home with family     DME Needed Upon Discharge  none     Discharge Plan discussed with: Patient     Transition of Care Barriers Substance Abuse;Underinsured

## 2024-01-26 NOTE — SUBJECTIVE & OBJECTIVE
Past Medical History:   Diagnosis Date    Renal disorder        No past surgical history on file.    Review of patient's allergies indicates:   Allergen Reactions    Penicillins Swelling       No current facility-administered medications on file prior to encounter.     Current Outpatient Medications on File Prior to Encounter   Medication Sig    HYDROcodone-acetaminophen (NORCO)  mg per tablet Take 1 tablet by mouth every 8 (eight) hours as needed for Pain.    tamsulosin (FLOMAX) 0.4 mg Cap Take 1 capsule (0.4 mg total) by mouth once daily.     Family History    None       Tobacco Use    Smoking status: Every Day    Smokeless tobacco: Never   Substance and Sexual Activity    Alcohol use: Not Currently    Drug use: Not Currently     Types: Cocaine, Methamphetamines    Sexual activity: Yes     Review of Systems   Constitutional:  Positive for activity change.   Respiratory:  Positive for shortness of breath.    Cardiovascular:  Negative for chest pain and leg swelling.     Objective:     Vital Signs (Most Recent):  Temp: 97.8 °F (36.6 °C) (01/25/24 1328)  Pulse: 104 (01/25/24 2218)  Resp: 19 (01/25/24 2218)  BP: 116/75 (01/25/24 2218)  SpO2: (!) 91 % (01/25/24 2218) Vital Signs (24h Range):  Temp:  [97.8 °F (36.6 °C)] 97.8 °F (36.6 °C)  Pulse:  [104-122] 104  Resp:  [11-26] 19  SpO2:  [91 %-98 %] 91 %  BP: (116-159)/() 116/75     Weight: 88.5 kg (195 lb)  Body mass index is 25.03 kg/m².     Physical Exam  Constitutional:       General: He is not in acute distress.  Cardiovascular:      Rate and Rhythm: Tachycardia present.   Pulmonary:      Effort: Pulmonary effort is normal. No respiratory distress.   Musculoskeletal:      Right lower leg: No edema.      Left lower leg: No edema.   Neurological:      Mental Status: He is alert and oriented to person, place, and time.                Significant Labs:  CBC, CMP, troponin, tox screen reviewed     Significant Imaging:  CXR, and CT reviewed

## 2024-01-26 NOTE — PLAN OF CARE
Problem: Respiratory Compromise (Heart Failure)  Goal: Effective Oxygenation and Ventilation  Outcome: Ongoing, Progressing  Intervention: Promote Airway Secretion Clearance  Flowsheets (Taken 1/26/2024 0945)  Breathing Techniques/Airway Clearance: pursed-lip breathing encouraged  Cough And Deep Breathing: done independently per patient  Intervention: Optimize Oxygenation and Ventilation  Flowsheets (Taken 1/26/2024 0945)  Airway/Ventilation Management: airway patency maintained   Patient in no apparent distress. Sat's  96 % on room air. Occasional shortness of present. PRN treatments not needed . Will continue to monitor.

## 2024-01-26 NOTE — ASSESSMENT & PLAN NOTE
Acute heart failure- unknown subtype  Continue IV diuresis  Tele  Keep K>4, MG>2  Daily weights and strict I's and O's  TTE today  Started GDMT

## 2024-01-26 NOTE — ASSESSMENT & PLAN NOTE
Case discussed with ER physician  Appears to be new CHF  Patient positive for meth; may be contributing to HF symptoms  Admit with CHF pathway; continue diuresis with furosemide 40 mg IV BID  2D echo  Stable

## 2024-01-26 NOTE — ASSESSMENT & PLAN NOTE
Likely a chronic issue but patient on no medications outpatient  Continue coreg and lisinopril- uptitrate as tolerated

## 2024-01-26 NOTE — CONSULTS
"  [facility] Intensive Care  Adult Nutrition  Consult Note    SUMMARY     Recommendations    Provide pt w/ low Na, fl restriction diet education prior to discharge.   DASH diet w/ 2300 mg Na restriction recommended. Fluid restriction per MD.    Assessment and Plan    Pt presented w/ SOB and volume overload. No relative wt hx to determine wt changes.     Reason for Assessment    Consult for Low Na and Fl restriction education.     Anthropometrics    Temp: 96.3 °F (35.7 °C)  Height: 6' 2" (188 cm)  Height (inches): 74 in  Weight Method: Standard Scale  Weight: 84.8 kg (187 lb)  Weight (lb): 187 lb  Ideal Body Weight (IBW), Male: 190 lb  % Ideal Body Weight, Male (lb): 98.42 %  BMI (Calculated): 24     Wt Readings from Last 30 Encounters:   01/26/24 84.8 kg (187 lb)   04/03/22 85.3 kg (188 lb)   ]    Lab/Procedures/Meds    Lab Results   Component Value Date    CO2 22 (L) 01/26/2024   ]     aspirin  81 mg Oral Daily    carvediloL  3.125 mg Oral BID    enoxparin  1 mg/kg Subcutaneous Q12H (treatment, non-standard time)    furosemide (LASIX) injection  40 mg Intravenous BID    lisinopriL  2.5 mg Oral Daily    magnesium sulfate IVPB  1 g Intravenous Once       Nutrition Prescription Ordered    Diet Low Sodium, 2 gm Fluid - 1500 mL    Evaluation of Received Nutrient/Fluid Intake       % Intake of Estimated Energy Needs: Other: No Intake documented   % Meal Intake: Other: No Intake documented       "

## 2024-01-26 NOTE — SUBJECTIVE & OBJECTIVE
Interval History: SOB improved. Good UOP with IV lasix. TTE today. Trop downtrending. On RA. Laying in bed flat with no difficulty. BP improved.    I evaluated patient yesterday evening prior to him being admitted to  last night. Per my discussion with patient.    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 Also with associated chest tightness. Symptoms worsening throughout the week. Had associated NV with his symptoms. No LE or abdominal swelling. Took one dose of meth that friend gave him because he thought it might help his SOB. Patient denies using any before this.     I consulted cardiology who recommended transfer for ischemic evaluation as this presentation was consistent with unstable angina.    Transfer process initiated this am. Accepted to [facility]. Started aspirin 81 daily. Started full dose AC    Review of Systems   All other systems reviewed and are negative.    Objective:     Vital Signs (Most Recent):  Temp: 96.3 °F (35.7 °C) (01/26/24 0734)  Pulse: 109 (01/26/24 0942)  Resp: 16 (01/26/24 0942)  BP: (!) 127/93 (01/26/24 0734)  SpO2: 96 % (01/26/24 0942) Vital Signs (24h Range):  Temp:  [96.3 °F (35.7 °C)-97.8 °F (36.6 °C)] 96.3 °F (35.7 °C)  Pulse:  [103-122] 109  Resp:  [8-26] 16  SpO2:  [89 %-98 %] 96 %  BP: (116-159)/() 127/93     Weight: 84.8 kg (187 lb)  Body mass index is 24.01 kg/m².    Intake/Output Summary (Last 24 hours) at 1/26/2024 0958  Last data filed at 1/26/2024 0638  Gross per 24 hour   Intake 550 ml   Output 4400 ml   Net -3850 ml         Physical Exam      Vitals reviewed  General: NAD, Well developed, Well Nourished  Head: NC/AT  Eyes: EOMI, KENDALL  Cardiovascular: Pulses intact distally, tachcyardia and regular rhythm  Pulmonary: Normal Respiratory Rate, No respiratory distress  Gi: Soft, Non-tender  Extremities: Warm, No edema present  Skin: Warm, dry  Neuro: Alert, Oriented x3, No focal Deficit  Psych: Appropriate mood and  affect      Significant Labs: All pertinent labs within the past 24 hours have been reviewed.    Significant Imaging: I have reviewed all pertinent imaging results/findings within the past 24 hours.

## 2024-01-26 NOTE — ASSESSMENT & PLAN NOTE
Troponin elevation with associated exertional chest tightness and SOB  Cardiology consulted- recommending transfer for ischemic eval  Continue ASA and therapeutic AC with lovenox  Troponin downtending  TTE today to evaluate EF and for WMA

## 2024-01-26 NOTE — PLAN OF CARE
Outside Transfer Acceptance Note / [facility]erral [facility]    Referring facility: [facility]   Referring provider: FILIPPO BULLARD  Accepting facility: [facility]  Accepting provider: Kiersten ARAUJO  Admitting provider: MATHEW  Reason for transfer:  Needs cardiology evaluation  Transfer diagnosis: Acute decompensated heart failure (not specified), Unstable angina  Transfer specialty requested: Cardiology  Transfer specialty notified: No  Transfer level: NUMBER 1-5: 2  Bed type requested: Med-tele  Isolation status: No active isolations   Admission class or status: IP- Inpatient      Narrative     53M HTN, tobacco abuse currently at [facility] for evaluation of acute decompensated heart failure and unstable angina. He initially presented with cc of shortness of breath. SOB has been present over the past week and has been worsening. He has BEAUCHAMP and orthopnea. During episodes of exertional SOB the patient has associated chest tightness with nausea and vomiting. Due to ongoing SOB the patient's friend suggested he use methamphetamine. The patient denies prior methamphetamine use to yesterday.  The patient is afebrile, tachy in the 100s, tachypneic with RR in 20s, hypertensive to 150s, and with sat in mid 90s on RA upon arrival. Exam non-contributory per review of ED documentation. Labs notable for mildly elevated d-dimer 0.83, no leucocytosis or anemia, normal renal function, elevated BNP to 1800 as well as flat troponinemia ~0.2 (no prior values for comparison). HCV testing is positive. Amphetamine positivity noted on UTox. ECG with LVH and repolarization abnl in pattern of inferolateral ischemia. CXR c/w mild congestive heart failure small bilateral pleural effusions. CTPE protocol with no PE, and findings suggestive of congestive heart failure with cardiomegaly/small volume pericardial effusion, small volume pleural effusions/atelectasis, and reflux of contrast into the IVC. Paraseptal emphysema.also noted.  Patient started on antihypertensives and lasix, as well as duonebs, to good effect on symptoms. An ECHO is ordered. Jossy discussed the case with their cardiology service who recommended initiation of transfer given concerning story for ACS and recommended addition of ASA and full dose anticoagulant. Patient targeted to [facility].          Objective     Vitals: Temp: 97.2 °F (36.2 °C) (01/26/24 0400)  Pulse: 105 (01/26/24 0400)  Resp: 18 (01/26/24 0400)  BP: 123/70 (01/26/24 0400)  SpO2: 97 % (01/26/24 0400)  Recent Labs: All pertinent labs within the past 24 hours have been reviewed.  Recent imaging: See above   Airway:     Vent settings:         IV access:        Peripheral IV - Single Lumen 01/25/24 1537 20 G Left Antecubital (Active)   Site Assessment Clean;Dry;Intact 01/26/24 0323   Extremity Assessment Distal to IV No abnormal discoloration 01/26/24 0039   Line Status Saline locked 01/26/24 0323   Dressing Status Clean;Dry;Intact 01/26/24 0323   Dressing Intervention Integrity maintained 01/26/24 0323   Dressing Change Due 01/29/24 01/26/24 0039   Site Change Due 01/29/24 01/26/24 0039   Reason Not Rotated Not due 01/26/24 0039            Peripheral IV - Single Lumen 01/26/24 0430 20 G Anterior;Left Forearm (Active)     Infusions: None (team to start therapeutic A/C)  Allergies:   Review of patient's allergies indicates:   Allergen Reactions    Penicillins Swelling      NPO: No    Anticoagulation:   Anticoagulants       Ordered     Route Frequency Start Stop    01/26/24 0731  enoxaparin  ( [facility] VTE PROPHYLAXIS HIGH RISK ORDER PANEL [COMPILED RECORD] [[employer] ORDERABLE [id number]])         SubQ Every 12 hours 01/26/24 1000 --             Instructions      [facility]  Admit to Hospital Medicine  Upon patient arrival to floor, please contact Hospital Medicine on call.

## 2024-01-26 NOTE — PLAN OF CARE
Problem: Cardiac Output Decreased (Heart Failure)  Goal: Optimal Cardiac Output  Outcome: Ongoing, Progressing     Problem: Fluid Imbalance (Heart Failure)  Goal: Fluid Balance  Outcome: Ongoing, Progressing     Problem: Oral Intake Inadequate (Heart Failure)  Goal: Optimal Nutrition Intake  Outcome: Ongoing, Progressing     Problem: Respiratory Compromise (Heart Failure)  Goal: Effective Oxygenation and Ventilation  Outcome: Ongoing, Progressing

## 2024-01-26 NOTE — H&P
[facility] Medicine  History & Physical    Patient Name: Yeison Manuel  MRN: [id number]  Admission Date: 1/25/2024  Attending Physician: Garrick Muller MD   Primary Care Provider: Leora, Primary Doctor         Patient information was obtained from patient and ER records.       Subjective:     Principal Problem:Volume overload    Chief Complaint:   Chief Complaint   Patient presents with    Shortness of Breath     Worsening shortness of breath over the last week. Seen at [facility] and told to go to ED.        HPI: The patient is a 54 y/o male with no known PMH who presents with SOB. SOB has been present over the past week and has been worsening. He has BEAUCHAMP and orthopnea. No known history of lung or hear problems. No swelling of the extremities or abdomen, CP, nausea, vomiting or other symptoms. Work up in the ER suggests new CHF. He reports feeling improved after being given lasix and nebs.     Past Medical History:   Diagnosis Date    Renal disorder        No past surgical history on file.    Review of patient's allergies indicates:   Allergen Reactions    Penicillins Swelling       No current facility-administered medications on file prior to encounter.     Current Outpatient Medications on File Prior to Encounter   Medication Sig    HYDROcodone-acetaminophen (NORCO)  mg per tablet Take 1 tablet by mouth every 8 (eight) hours as needed for Pain.    tamsulosin (FLOMAX) 0.4 mg Cap Take 1 capsule (0.4 mg total) by mouth once daily.     Family History    None       Tobacco Use    Smoking status: Every Day    Smokeless tobacco: Never   Substance and Sexual Activity    Alcohol use: Not Currently    Drug use: Not Currently     Types: Cocaine, Methamphetamines    Sexual activity: Yes     Review of Systems   Constitutional:  Positive for activity change.   Respiratory:  Positive for shortness of breath.    Cardiovascular:  Negative for chest pain and leg swelling.     Objective:      Vital Signs (Most Recent):  Temp: 97.8 °F (36.6 °C) (01/25/24 1328)  Pulse: 104 (01/25/24 2218)  Resp: 19 (01/25/24 2218)  BP: 116/75 (01/25/24 2218)  SpO2: (!) 91 % (01/25/24 2218) Vital Signs (24h Range):  Temp:  [97.8 °F (36.6 °C)] 97.8 °F (36.6 °C)  Pulse:  [104-122] 104  Resp:  [11-26] 19  SpO2:  [91 %-98 %] 91 %  BP: (116-159)/() 116/75     Weight: 88.5 kg (195 lb)  Body mass index is 25.03 kg/m².     Physical Exam  Constitutional:       General: He is not in acute distress.  Cardiovascular:      Rate and Rhythm: Tachycardia present.   Pulmonary:      Effort: Pulmonary effort is normal. No respiratory distress.   Musculoskeletal:      Right lower leg: No edema.      Left lower leg: No edema.   Neurological:      Mental Status: He is alert and oriented to person, place, and time.                Significant Labs:  CBC, CMP, troponin, tox screen reviewed     Significant Imaging:  CXR, and CT reviewed   Assessment/Plan:     * Volume overload  Case discussed with ER physician  Appears to be new CHF  Patient positive for meth; may be contributing to HF symptoms  Admit with CHF pathway; continue diuresis with furosemide 40 mg IV BID  2D echo  Stable         VTE Risk Mitigation (From admission, onward)           Ordered     enoxaparin injection 40 mg  Daily         01/25/24 2103     IP VTE HIGH RISK PATIENT  Once         01/25/24 2103     Place sequential compression device  Until discontinued         01/25/24 2103                         The attending portion of this evaluation, treatment, and documentation was performed per Ole Hanna MD via Telemedicine AudioVisual using the secure [employer] software platform with 2 way audio/video. The provider was located off-site and the patient is located in the hospital. The aforementioned video software was utilized to document the relevant history and physical exam    On 01/25/2024, patient should be placed in hospital observation services under my  care.        Ole Hanna MD  Department of Hospital Medicine   [facility] - Emergency Dept

## 2024-01-26 NOTE — ED PROVIDER NOTES
Encounter Date: 1/25/2024       History     Chief Complaint   Patient presents with    Shortness of Breath     Worsening shortness of breath over the last week. Seen at [facility] and told to go to ED.     53-year-old male, here from home complaining of shortness of breath x1 week.  He also complains of some mild intermittent chest pains.  Denies any fevers or chills.  Denies significant cough.  Nothing he does makes his symptoms any better or worse.  He went to a clinic today and was told that he needs to come to the emergency department.  He is somewhat tachycardic here, but does not appear to be significantly short of breath.  He was also somewhat hypertensive.  O2 saturations 96% room air, respiratory rate 19 per minute.  No diaphoresis, no nausea vomiting.  Patient does state that he had a heart attack many many years ago.    The history is provided by the patient. No  was used.     Review of patient's allergies indicates:   Allergen Reactions    Penicillins Swelling    Codeine Nausea Only     Past Medical History:   Diagnosis Date    Renal disorder      No past surgical history on file.  No family history on file.  Social History     Tobacco Use    Smoking status: Every Day    Smokeless tobacco: Never   Substance Use Topics    Alcohol use: Not Currently    Drug use: Not Currently     Types: Cocaine, Methamphetamines     Review of Systems   Constitutional: Negative.    HENT: Negative.     Eyes: Negative.    Respiratory:  Positive for chest tightness and shortness of breath.    Cardiovascular: Negative.  Negative for palpitations.   Gastrointestinal: Negative.    Endocrine: Negative.    Genitourinary: Negative.    Musculoskeletal: Negative.    Skin: Negative.    Allergic/Immunologic: Negative.    Neurological: Negative.    Psychiatric/Behavioral: Negative.         Physical Exam     Initial Vitals   BP Pulse Resp Temp SpO2   01/25/24 1331 01/25/24 1328 01/25/24 1328 01/25/24 1328 01/25/24  1328   (!) 155/108 (!) 115 (!) 24 97.8 °F (36.6 °C) 95 %      MAP       --                Physical Exam    Nursing note and vitals reviewed.  Constitutional: He appears well-developed and well-nourished. He is not diaphoretic. No distress.   HENT:   Head: Normocephalic and atraumatic.   Nose: Nose normal.   Mouth/Throat: Oropharynx is clear and moist. No oropharyngeal exudate.   Eyes: Conjunctivae and EOM are normal. Pupils are equal, round, and reactive to light. No scleral icterus.   Neck: Neck supple. No JVD present.   Normal range of motion.  Cardiovascular:  Regular rhythm, normal heart sounds and intact distal pulses.           No murmur heard.  Tachycardic, about 120 beats per minute.   Pulmonary/Chest: Breath sounds normal. No stridor. No respiratory distress. He has no wheezes. He has no rhonchi. He has no rales.   Abdominal: Abdomen is soft. Bowel sounds are normal. He exhibits no distension. There is no abdominal tenderness.   Musculoskeletal:         General: No tenderness or edema. Normal range of motion.      Cervical back: Normal range of motion and neck supple.     Neurological: He is alert and oriented to person, place, and time. He has normal strength. No cranial nerve deficit or sensory deficit. GCS score is 15. GCS eye subscore is 4. GCS verbal subscore is 5. GCS motor subscore is 6.   Skin: Skin is warm and dry. Capillary refill takes less than 2 seconds. No erythema.   Psychiatric: He has a normal mood and affect.         ED Course   Critical Care    Date/Time: 2/23/2024 6:27 PM    Performed by: Lila Jones MD  Authorized by: Lila Jones MD  Direct patient critical care time: 10 minutes  Additional history critical care time: 10 minutes  Ordering / reviewing critical care time: 10 minutes  Documentation critical care time: 10 minutes  Total critical care time (exclusive of procedural time) : 40 minutes  Critical care time was exclusive of separately billable procedures and treating  other patients and teaching time.  Critical care was necessary to treat or prevent imminent or life-threatening deterioration of the following conditions: circulatory failure and cardiac failure.  Critical care was time spent personally by me on the following activities: interpretation of cardiac output measurements, evaluation of patient's response to treatment, examination of patient, obtaining history from patient or surrogate, ordering and performing treatments and interventions, ordering and review of laboratory studies, ordering and review of radiographic studies, pulse oximetry, re-evaluation of patient's condition and review of old charts.  Subsequent provider of critical care: I assumed direction of critical care for this patient from another provider of my specialty.        Labs Reviewed   HEPATITIS C ANTIBODY - Abnormal; Notable for the following components:       Result Value    Hepatitis C Ab Reactive (*)     All other components within normal limits    Narrative:     Release to patient->Immediate   COMPREHENSIVE METABOLIC PANEL - Abnormal; Notable for the following components:    Glucose 157 (*)     All other components within normal limits    Narrative:     Release to patient->Immediate   TROPONIN I - Abnormal; Notable for the following components:    Troponin I 0.190 (*)     All other components within normal limits    Narrative:     Release to patient->Immediate   B-TYPE NATRIURETIC PEPTIDE - Abnormal; Notable for the following components:    BNP 1,854 (*)     All other components within normal limits    Narrative:     Release to patient->Immediate   D DIMER, QUANTITATIVE - Abnormal; Notable for the following components:    D-Dimer 0.83 (*)     All other components within normal limits    Narrative:     Release to patient->Immediate  D Dimer critical result(s) called and verbal readback obtained from   Pierce Barrera. by BRIGITTE 01/25/2024 16:28  Recoll. [id number] by [facility] at 01/25/2024 14:05, reason:  Insufficient   specimen   URINALYSIS, REFLEX TO URINE CULTURE - Abnormal; Notable for the following components:    Protein, UA 2+ (*)     Occult Blood UA 1+ (*)     All other components within normal limits    Narrative:     Preferred Collection Type->Urine, Clean Catch  Specimen Source->Urine   DRUG SCREEN PANEL, URINE EMERGENCY - Abnormal; Notable for the following components:    Amphetamine Screen, Ur Presumptive Positive (*)     All other components within normal limits    Narrative:     Preferred Collection Type->Urine, Clean Catch  Specimen Source->Urine   B-TYPE NATRIURETIC PEPTIDE - Abnormal; Notable for the following components:    BNP 1,854 (*)     All other components within normal limits    Narrative:     Release to patient->Immediate   URINALYSIS MICROSCOPIC - Abnormal; Notable for the following components:    RBC, UA 10 (*)     Bacteria Few (*)     All other components within normal limits    Narrative:     Preferred Collection Type->Urine, Clean Catch  Specimen Source->Urine   TROPONIN I - Abnormal; Notable for the following components:    Troponin I 0.171 (*)     All other components within normal limits   TROPONIN I - Abnormal; Notable for the following components:    Troponin I 0.206 (*)     All other components within normal limits   INFLUENZA A & B BY MOLECULAR   HIV 1 / 2 ANTIBODY    Narrative:     Release to patient->Immediate   CBC W/ AUTO DIFFERENTIAL    Narrative:     Release to patient->Immediate   SARS-COV-2 RNA AMPLIFICATION, QUAL    Narrative:     Is the patient symptomatic?->No   LACTIC ACID, PLASMA    Narrative:     Release to patient->Immediate     EKG Readings: (Independently Interpreted)   EKG personally reviewed by me shows sinus tachycardia at 116 beats per minute.  Left ventricular hypertrophy, QRS slightly wider than normal, T-wave changes in the lateral leads.  ID interval 152, .      ECG Results              Repeat EKG 12-lead (Final result)  Result time 01/26/24  08:40:35      Final result by Interface, Lab In [facility] (01/26/24 08:40:35)                   Narrative:    Test Reason : R06.02,    Vent. Rate : 114 BPM     Atrial Rate : 114 BPM     P-R Int : 176 ms          QRS Dur : 130 ms      QT Int : 370 ms       P-R-T Axes : 070 067 015 degrees     QTc Int : 509 ms    Sinus tachycardia with Fusion complexes  LVH with QRS widening  T wave abnormality, consider inferolateral ischemia  Abnormal ECG  When compared with ECG of 25-JAN-2024 13:40,  Fusion complexes are now Present  Confirmed by Zhang Fairchild MD ([id number]) on 1/26/2024 8:40:28 AM    Referred By: DASHAWN   SELF           Confirmed By:Zhang Fairchild MD                                     EKG 12-lead (Final result)  Result time 01/26/24 08:43:34      Final result by Interface, Lab In [facility] (01/26/24 08:43:34)                   Narrative:    Test Reason : R06.02,    Vent. Rate : 116 BPM     Atrial Rate : 116 BPM     P-R Int : 152 ms          QRS Dur : 134 ms      QT Int : 356 ms       P-R-T Axes : 077 076 -57 degrees     QTc Int : 494 ms    Sinus tachycardia  LVH with QRS widening  T wave abnormality, consider lateral ischemia  Abnormal ECG  No previous ECGs available  Confirmed by Zhang Fairchild MD ([id number]) on 1/26/2024 8:43:23 AM    Referred By: DASHAWN   SELF           Confirmed By:Zhang Fairchild MD                                     EKG 12-LEAD (Final result)  Result time 02/08/24 11:46:43      Final result by Unknown User (02/08/24 11:46:43)                                      Imaging Results              CTA Chest Non-Coronary (PE Studies) (Final result)  Result time 01/25/24 17:47:59      Final result by Chaka Soliz MD (01/25/24 17:47:59)                   Impression:      1. No evidence of a pulmonary embolism.  2. Findings suggestive of congestive heart failure with cardiomegaly/small volume pericardial effusion, small volume pleural effusions/atelectasis, and reflux of contrast into the IVC.  Findings can be correlated for  clinically.  3. Paraseptal emphysema.  Predominantly solid nodular opacity within the left upper lobe, nonspecific.  This measures 7 mm.  Please see below for recommendations on follow-up.  4. Nonobstructing bilateral nephrolithiasis.  Yellow Pulmonary Nodule 2017 Alert:    Yellow Actionable Finding (Radiology: Volume 284: Number 1-July 2017)    Fleischner Guidelines do not apply in patients younger than 35 years, immunocompromised patients or patients with cancer. Risk assignment based on ACCP with low risk being less than 5% and high risk combining intermediate and high risk categories.    UNEXPECTED FINDINGS:  Incidental Pulmonary Nodule Single Solid 6-8 mm (2)    RECOMMENDATIONS:  If Low Risk CT Chest without contrast 6-12 months then consider 18-24 months, if High Risk CT Chest without contrast at 6-12 months and 18-24 months      Electronically signed by: Chaka Soliz  Date:    01/25/2024  Time:    17:47               Narrative:    EXAMINATION:  CTA CHEST NON CORONARY (PE STUDIES)    CLINICAL HISTORY:  Pulmonary embolism (PE) suspected, positive D-dimer;    TECHNIQUE:  Low dose axial images, sagittal and coronal reformations were obtained from the thoracic inlet to the lung bases following the IV administration of 100 mL of Omnipaque 350.  Contrast timing was optimized to evaluate the pulmonary arteries.  MIP images were performed.    COMPARISON:  Chest x-ray 01/25/2024.    FINDINGS:  Pulmonary Arteries: This study is adequate for the evaluation of pulmonary thromboembolism.  No evidence of pulmonary embolism to the level of the subsegmental arteries bilaterally.    Soft tissues of the neck:  Visualized portion of the thyroid is normal in appearance.    Thoracic aorta:  Normal in caliber and contour.  No evidence of dissection.    Heart: Redemonstrated cardiomegaly and small volume pericardial effusion.  Reflux of contrast into the inferior vena cava suggestive of underlying congestive heart failure.    Lymph  nodes:  Several borderline prominent mediastinal lymph nodes are identified.  This is presumably reactive although nonspecific.  For reference there is a 1.2 cm short axis dimension right paratracheal node.    Trachea and bronchi: Patent.    Lungs:  Small volume pleural effusions and atelectasis, right greater than left.  Scattered paraseptal emphysematous changes and biapical pulmonary scarring.  Atelectasis along the left fissure as well as a predominantly solid nodular opacity within the left upper lobe (series 3, image 104) measuring 7 mm.  No discrete mass.  No pneumothorax or abnormal pleural thickening.    Limited evaluation of the upper abdomen:  Nonobstructing bilateral nephrolithiasis noted.  Bilateral renal calculi are identified the largest on the right measuring 5 mm within the midpole region.    Osseous structures:  No evidence of fractures or suspicious osseous lesions.                                        X-Ray Chest 1 View (Final result)  Result time 01/25/24 14:18:36      Final result by Gianni Aguilar MD (01/25/24 14:18:36)                   Impression:      Findings consistent with mild congestive heart failure small bilateral pleural effusions.    Close interval follow-up is recommended.  This report was flagged in Epic as abnormal.      Electronically signed by: Gianni Aguilar  Date:    01/25/2024  Time:    14:18               Narrative:    EXAMINATION:  XR CHEST 1 VIEW    CLINICAL HISTORY:  shortness of breath;    TECHNIQUE:  Single frontal view of the chest was performed.    COMPARISON:  05/06/2009.    FINDINGS:  There is a mild diffuse prominence of the pulmonary interstitium suggesting mild interstitial edema.  Heart size is globally enlarged.  Small bilateral pleural effusions with bibasilar dependent atelectasis.  Bony thorax intact.                                    X-Rays:   Independently Interpreted Readings:   Other Readings:  Chest x-ray personally reviewed by me shows  possible mild interstitial haziness consistent with mild edema.  Cardiac silhouette is enlarged.  Normal skeletal structures.    Medications   aspirin tablet 325 mg (325 mg Oral Not Given 1/25/24 1445)   furosemide injection 60 mg (60 mg Intravenous Given 1/25/24 1606)   lisinopriL tablet 10 mg (10 mg Oral Given 1/25/24 1624)   nitroGLYCERIN 2% TD oint ointment 1 inch (1 inch Transdermal Given 1/25/24 1624)   enoxaparin injection 90 mg (90 mg Subcutaneous Given 1/25/24 1624)   iohexoL (OMNIPAQUE 350) injection 100 mL (100 mLs Intravenous Given 1/25/24 1719)   potassium chloride SA CR tablet 40 mEq (40 mEq Oral Given 1/26/24 0845)   magnesium sulfate in dextrose IVPB (premix) 1 g (0 g Intravenous Stopped 1/26/24 0956)     Medical Decision Making  Differential includes pneumonia, pneumothorax, pulmonary embolus, myocardial infarction, COVID-19, dehydration, COPD, CHF, etc.    Lab work shows an elevated BNP.  No obvious infectious process.  Troponin elevated as well.  Patient has been given an aspirin and 1 milligram/kilogram Lovenox.  Discussed this patient with the hospitalist, who in turn discussed this patient with Cardiology.  Cardiology recommended rule out for PE, and probable transfer to another facility whether this is a cardiac issue or a PE.  Patient's 2nd troponin came back elevated, but slightly lower than the 1st.  His D-dimer however, did come back somewhat elevated.  A CT, PE protocol is pending.  At shift change, the patient's care be transferred to Dr. Jones who will review findings and make disposition.  Of note, patient's urine drug screen was back positive for amphetamines.  I asked the patient about this and he told me that he did use amphetamines 1 time but this was after his shortness of breath started, and he use the amphetamines because his friend told him that the amphetamines would help with his shortness of breath.    1800:  I assumed care of this patient at approximately 6:00 p.m..  At  "that time on my evaluation patient states he is feeling better after medications provided.  He denies ever having any chest pain states he has had shortness of breath for about a week.  Denies cough runny nose or fever.  States he does not typically follow up with doctors regularly so he is never seen a cardiologist before he has not regularly on any medications.  He denies any swelling in his lower extremities initially but after some states he thinks his ankles may be a little swollen.  Patient is agreeable to admission and potentially transfer if need be.  He was informed that his heart is currently" under stress".  And that is likely what is causing his shortness of breath.  Chest x-ray and CTAs are consistent with congestive heart failure with small bilateral pleural effusions.  Initial EKG which was done at 1:40 p.m. shows diffuse nonspecific T-wave changes most obvious in V4 V5 and V6.  EKG repeated at 6:53 p.m. is the same without changes.  There are inverted T-waves in V4 V5 and V6.    Amount and/or Complexity of Data Reviewed  Labs: ordered. Decision-making details documented in ED Course.  Radiology: ordered. Decision-making details documented in ED Course.  ECG/medicine tests:  Decision-making details documented in ED Course.    Risk  OTC drugs.  Prescription drug management.                                      Clinical Impression:  Final diagnoses:  [R06.02] Shortness of breath  [R06.02] SOB (shortness of breath)  [I50.9] Acute congestive heart failure, unspecified heart failure type - new onset (Primary)  [I21.4] NSTEMI (non-ST elevated myocardial infarction)          ED Disposition Condition    Observation                   Lila Jones MD  01/26/24 0043       Lila Jones MD  02/23/24 1828    "

## 2024-01-26 NOTE — PROGRESS NOTES
[facility]  Progress Note    Patient Name: Yeison Manuel  MRN: [id number]  Patient Class: IP- Inpatient   Admission Date: 1/25/2024  Length of Stay: 0 days  Attending Physician: Garrick Muller MD  Primary Care Provider: Leora, Primary Doctor        Subjective:     Principal Problem:Unstable angina        HPI:  The patient is a 52 y/o male with no known PMH who presents with SOB. SOB has been present over the past week and has been worsening. He has BEAUCHAMP and orthopnea. No known history of lung or hear problems. No swelling of the extremities or abdomen, CP, nausea, vomiting or other symptoms. Work up in the ER suggests new CHF. He reports feeling improved after being given lasix and nebs.     Overview/Hospital Course:  No notes on file    Interval History: SOB improved. Good UOP with IV lasix. TTE today. Trop downtrending. On RA. Laying in bed flat with no difficulty. BP improved.    I evaluated patient yesterday evening prior to him being admitted to  last night. Per my discussion with patient.    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 Also with associated chest tightness. Symptoms worsening throughout the week. Had associated NV with his symptoms. No LE or abdominal swelling. Took one dose of meth that friend gave him because he thought it might help his SOB. Patient denies using any before this.     I consulted cardiology who recommended transfer for ischemic evaluation as this presentation was consistent with unstable angina.    Transfer process initiated this am. Accepted to [facility]. Started aspirin 81 daily. Started full dose AC    Review of Systems   All other systems reviewed and are negative.    Objective:     Vital Signs (Most Recent):  Temp: 96.3 °F (35.7 °C) (01/26/24 0734)  Pulse: 109 (01/26/24 0942)  Resp: 16 (01/26/24 0942)  BP: (!) 127/93 (01/26/24 0734)  SpO2: 96 % (01/26/24 0942) Vital Signs (24h Range):  Temp:   [96.3 °F (35.7 °C)-97.8 °F (36.6 °C)] 96.3 °F (35.7 °C)  Pulse:  [103-122] 109  Resp:  [8-26] 16  SpO2:  [89 %-98 %] 96 %  BP: (116-159)/() 127/93     Weight: 84.8 kg (187 lb)  Body mass index is 24.01 kg/m².    Intake/Output Summary (Last 24 hours) at 1/26/2024 0957  Last data filed at 1/26/2024 0638  Gross per 24 hour   Intake 550 ml   Output 4400 ml   Net -3850 ml         Physical Exam      Vitals reviewed  General: NAD, Well developed, Well Nourished  Head: NC/AT  Eyes: EOMI, KENDALL  Cardiovascular: Pulses intact distally, tachcyardia and regular rhythm  Pulmonary: Normal Respiratory Rate, No respiratory distress  Gi: Soft, Non-tender  Extremities: Warm, No edema present  Skin: Warm, dry  Neuro: Alert, Oriented x3, No focal Deficit  Psych: Appropriate mood and affect      Significant Labs: All pertinent labs within the past 24 hours have been reviewed.    Significant Imaging: I have reviewed all pertinent imaging results/findings within the past 24 hours.    Assessment/Plan:      * Unstable angina  Troponin elevation with associated exertional chest tightness and SOB  Cardiology consulted- recommending transfer for ischemic eval  Continue ASA and therapeutic AC with lovenox  Troponin downtending  TTE today to evaluate EF and for WMA      COPD (chronic obstructive pulmonary disease)  Patient's COPD currently controlled  PRN nebulized treatments  Needs f/u with pulm    Methamphetamine use  Counseled on cessation  Patient states he only used one time       Tobacco abuse  Counseled on cessation  Hold off on nicotine patches until ischemic eval complete      Hypertension  Likely a chronic issue but patient on no medications outpatient  Continue coreg and lisinopril- uptitrate as tolerated    Acute heart failure  Acute heart failure- unknown subtype  Continue IV diuresis  Tele  Keep K>4, MG>2  Daily weights and strict I's and O's  TTE today  Started GDMT        VTE Risk Mitigation (From admission, onward)            Ordered     enoxaparin injection 80 mg  Every 12 hours         01/26/24 0731     IP VTE HIGH RISK PATIENT  Once         01/25/24 2103     Place sequential compression device  Until discontinued         01/25/24 2103                    Discharge Planning   MARITO:      Code Status: Full Code   Is the patient medically ready for discharge?:     Reason for patient still in hospital (select all that apply): Treatment                     Garrick Muller MD  Department of [facility]   [place] [facility] [place]

## 2024-01-27 ENCOUNTER — HOSPITAL ENCOUNTER (INPATIENT)
Facility: HOSPITAL | Age: 54
LOS: 3 days | Discharge: HOME | DRG: 291 | End: 2024-01-30
Attending: HOSPITALIST | Admitting: HOSPITALIST
Payer: COMMERCIAL

## 2024-01-27 VITALS
BODY MASS INDEX: 24 KG/M2 | HEART RATE: 111 BPM | WEIGHT: 187 LBS | SYSTOLIC BLOOD PRESSURE: 116 MMHG | OXYGEN SATURATION: 97 % | HEIGHT: 74 IN | RESPIRATION RATE: 16 BRPM | DIASTOLIC BLOOD PRESSURE: 89 MMHG | TEMPERATURE: 98 F

## 2024-01-27 DIAGNOSIS — I50.9 ACUTE DECOMPENSATED HEART FAILURE: ICD-10-CM

## 2024-01-27 DIAGNOSIS — R07.9 CHEST PAIN: Primary | ICD-10-CM

## 2024-01-27 PROBLEM — R06.09 DYSPNEA ON EXERTION: Status: ACTIVE | Noted: 2024-01-27

## 2024-01-27 LAB
ALBUMIN SERPL BCP-MCNC: 3.6 G/DL (ref 3.5–5.2)
ALP SERPL-CCNC: 108 U/L (ref 55–135)
ALT SERPL W/O P-5'-P-CCNC: 17 U/L (ref 10–44)
ANION GAP SERPL CALC-SCNC: 11 MMOL/L (ref 8–16)
AST SERPL-CCNC: 20 U/L (ref 10–40)
BASOPHILS # BLD AUTO: 0.14 K/UL (ref 0–0.2)
BASOPHILS NFR BLD: 1.4 % (ref 0–1.9)
BILIRUB SERPL-MCNC: 0.9 MG/DL (ref 0.1–1)
BUN SERPL-MCNC: 13 MG/DL (ref 6–20)
CALCIUM SERPL-MCNC: 8.9 MG/DL (ref 8.7–10.5)
CHLORIDE SERPL-SCNC: 104 MMOL/L (ref 95–110)
CO2 SERPL-SCNC: 21 MMOL/L (ref 23–29)
CREAT SERPL-MCNC: 1 MG/DL (ref 0.5–1.4)
DIFFERENTIAL METHOD BLD: ABNORMAL
EOSINOPHIL # BLD AUTO: 0.7 K/UL (ref 0–0.5)
EOSINOPHIL NFR BLD: 7.2 % (ref 0–8)
ERYTHROCYTE [DISTWIDTH] IN BLOOD BY AUTOMATED COUNT: 14.1 % (ref 11.5–14.5)
EST. GFR  (NO RACE VARIABLE): >60 ML/MIN/1.73 M^2
GLUCOSE SERPL-MCNC: 109 MG/DL (ref 70–110)
HCT VFR BLD AUTO: 50.8 % (ref 40–54)
HGB BLD-MCNC: 17.8 G/DL (ref 14–18)
IMM GRANULOCYTES # BLD AUTO: 0.02 K/UL (ref 0–0.04)
IMM GRANULOCYTES NFR BLD AUTO: 0.2 % (ref 0–0.5)
LYMPHOCYTES # BLD AUTO: 2.6 K/UL (ref 1–4.8)
LYMPHOCYTES NFR BLD: 26.1 % (ref 18–48)
MAGNESIUM SERPL-MCNC: 1.9 MG/DL (ref 1.6–2.6)
MCH RBC QN AUTO: 30.1 PG (ref 27–31)
MCHC RBC AUTO-ENTMCNC: 35 G/DL (ref 32–36)
MCV RBC AUTO: 86 FL (ref 82–98)
MONOCYTES # BLD AUTO: 0.9 K/UL (ref 0.3–1)
MONOCYTES NFR BLD: 8.5 % (ref 4–15)
NEUTROPHILS # BLD AUTO: 5.7 K/UL (ref 1.8–7.7)
NEUTROPHILS NFR BLD: 56.6 % (ref 38–73)
NRBC BLD-RTO: 0 /100 WBC
PLATELET # BLD AUTO: 371 K/UL (ref 150–450)
PMV BLD AUTO: 9.5 FL (ref 9.2–12.9)
POTASSIUM SERPL-SCNC: 4.1 MMOL/L (ref 3.5–5.1)
PROT SERPL-MCNC: 7.1 G/DL (ref 6–8.4)
RBC # BLD AUTO: 5.91 M/UL (ref 4.6–6.2)
SODIUM SERPL-SCNC: 136 MMOL/L (ref 136–145)
WBC # BLD AUTO: 10.13 K/UL (ref 3.9–12.7)

## 2024-01-27 PROCEDURE — 25000003 PHARM REV CODE 250: Performed by: INTERNAL MEDICINE

## 2024-01-27 PROCEDURE — 80053 COMPREHEN METABOLIC PANEL: CPT | Performed by: STUDENT IN AN ORGANIZED HEALTH CARE EDUCATION/TRAINING PROGRAM

## 2024-01-27 PROCEDURE — 94640 AIRWAY INHALATION TREATMENT: CPT

## 2024-01-27 PROCEDURE — 85025 COMPLETE CBC W/AUTO DIFF WBC: CPT | Performed by: STUDENT IN AN ORGANIZED HEALTH CARE EDUCATION/TRAINING PROGRAM

## 2024-01-27 PROCEDURE — 27000221 HC OXYGEN, UP TO 24 HOURS

## 2024-01-27 PROCEDURE — 83735 ASSAY OF MAGNESIUM: CPT | Performed by: STUDENT IN AN ORGANIZED HEALTH CARE EDUCATION/TRAINING PROGRAM

## 2024-01-27 PROCEDURE — 11000001 HC ACUTE MED/SURG PRIVATE ROOM

## 2024-01-27 PROCEDURE — 25000242 PHARM REV CODE 250 ALT 637 W/ HCPCS: Performed by: INTERNAL MEDICINE

## 2024-01-27 PROCEDURE — 99900035 HC TECH TIME PER 15 MIN (STAT)

## 2024-01-27 PROCEDURE — 99238 HOSP IP/OBS DSCHRG MGMT 30/<: CPT | Mod: ,,, | Performed by: STUDENT IN AN ORGANIZED HEALTH CARE EDUCATION/TRAINING PROGRAM

## 2024-01-27 PROCEDURE — 63600175 PHARM REV CODE 636 W HCPCS: Performed by: INTERNAL MEDICINE

## 2024-01-27 RX ORDER — NALOXONE HCL 0.4 MG/ML
0.02 VIAL (ML) INJECTION
Status: DISCONTINUED | OUTPATIENT
Start: 2024-01-27 | End: 2024-01-30 | Stop reason: HOSPADM

## 2024-01-27 RX ORDER — TAMSULOSIN HYDROCHLORIDE 0.4 MG/1
0.4 CAPSULE ORAL DAILY
Status: DISCONTINUED | OUTPATIENT
Start: 2024-01-27 | End: 2024-01-30 | Stop reason: HOSPADM

## 2024-01-27 RX ORDER — GLUCAGON 1 MG
1 KIT INJECTION
Status: DISCONTINUED | OUTPATIENT
Start: 2024-01-27 | End: 2024-01-30 | Stop reason: HOSPADM

## 2024-01-27 RX ORDER — ONDANSETRON HYDROCHLORIDE 2 MG/ML
4 INJECTION, SOLUTION INTRAVENOUS EVERY 8 HOURS PRN
Status: DISCONTINUED | OUTPATIENT
Start: 2024-01-27 | End: 2024-01-30 | Stop reason: HOSPADM

## 2024-01-27 RX ORDER — SODIUM CHLORIDE 0.9 % (FLUSH) 0.9 %
10 SYRINGE (ML) INJECTION EVERY 12 HOURS PRN
Status: DISCONTINUED | OUTPATIENT
Start: 2024-01-27 | End: 2024-01-30 | Stop reason: HOSPADM

## 2024-01-27 RX ORDER — IBUPROFEN 200 MG
24 TABLET ORAL
Status: DISCONTINUED | OUTPATIENT
Start: 2024-01-27 | End: 2024-01-30 | Stop reason: HOSPADM

## 2024-01-27 RX ORDER — LISINOPRIL 2.5 MG/1
2.5 TABLET ORAL DAILY
Status: DISCONTINUED | OUTPATIENT
Start: 2024-01-27 | End: 2024-01-30 | Stop reason: HOSPADM

## 2024-01-27 RX ORDER — ENOXAPARIN SODIUM 100 MG/ML
1 INJECTION SUBCUTANEOUS EVERY 12 HOURS
Status: DISCONTINUED | OUTPATIENT
Start: 2024-01-27 | End: 2024-01-29

## 2024-01-27 RX ORDER — FUROSEMIDE 10 MG/ML
40 INJECTION INTRAMUSCULAR; INTRAVENOUS 2 TIMES DAILY
Status: DISCONTINUED | OUTPATIENT
Start: 2024-01-27 | End: 2024-01-30

## 2024-01-27 RX ORDER — NAPROXEN SODIUM 220 MG/1
81 TABLET, FILM COATED ORAL DAILY
Status: DISCONTINUED | OUTPATIENT
Start: 2024-01-27 | End: 2024-01-30 | Stop reason: HOSPADM

## 2024-01-27 RX ORDER — IBUPROFEN 200 MG
16 TABLET ORAL
Status: DISCONTINUED | OUTPATIENT
Start: 2024-01-27 | End: 2024-01-30 | Stop reason: HOSPADM

## 2024-01-27 RX ORDER — IPRATROPIUM BROMIDE AND ALBUTEROL SULFATE 2.5; .5 MG/3ML; MG/3ML
3 SOLUTION RESPIRATORY (INHALATION) EVERY 4 HOURS PRN
Status: DISCONTINUED | OUTPATIENT
Start: 2024-01-27 | End: 2024-01-30 | Stop reason: HOSPADM

## 2024-01-27 RX ORDER — SODIUM CHLORIDE 0.9 % (FLUSH) 0.9 %
10 SYRINGE (ML) INJECTION
Status: DISCONTINUED | OUTPATIENT
Start: 2024-01-27 | End: 2024-01-30 | Stop reason: HOSPADM

## 2024-01-27 RX ORDER — ACETAMINOPHEN 325 MG/1
650 TABLET ORAL EVERY 4 HOURS PRN
Status: DISCONTINUED | OUTPATIENT
Start: 2024-01-27 | End: 2024-01-30 | Stop reason: HOSPADM

## 2024-01-27 RX ORDER — CARVEDILOL 3.12 MG/1
3.12 TABLET ORAL 2 TIMES DAILY
Status: DISCONTINUED | OUTPATIENT
Start: 2024-01-27 | End: 2024-01-30 | Stop reason: HOSPADM

## 2024-01-27 RX ADMIN — CARVEDILOL 3.12 MG: 3.12 TABLET, FILM COATED ORAL at 08:01

## 2024-01-27 RX ADMIN — IPRATROPIUM BROMIDE AND ALBUTEROL SULFATE 3 ML: 2.5; .5 SOLUTION RESPIRATORY (INHALATION) at 11:01

## 2024-01-27 RX ADMIN — FUROSEMIDE 40 MG: 10 INJECTION, SOLUTION INTRAVENOUS at 08:01

## 2024-01-27 RX ADMIN — LISINOPRIL 2.5 MG: 2.5 TABLET ORAL at 04:01

## 2024-01-27 RX ADMIN — ENOXAPARIN SODIUM 80 MG: 80 INJECTION SUBCUTANEOUS at 04:01

## 2024-01-27 RX ADMIN — ASPIRIN 81 MG CHEWABLE TABLET 81 MG: 81 TABLET CHEWABLE at 04:01

## 2024-01-27 RX ADMIN — TAMSULOSIN HYDROCHLORIDE 0.4 MG: 0.4 CAPSULE ORAL at 04:01

## 2024-01-27 NOTE — H&P
[facility] Medicine  History & Physical    Patient Name: Yeison Manuel  MRN: [id number]  Patient Class: IP- Inpatient  Admission Date: 1/27/2024  Attending Physician: Fadi Fairbanks MD   Primary Care Provider: Leora Primary Doctor         Patient information was obtained from patient and ER records.     Subjective:     Principal Problem:Acute decompensated heart failure    Chief Complaint:   Chief Complaint   Patient presents with    Shortness of Breath        HPI:  53-year-old male with a past medical history of drug abuse; was transferred to [facility] from [facility] after going to the hospital for shortness of breath for the past week.  The patient also complains of intermittent chest pain.  He decided to go into the clinic and was asked to come to the emergency department.  The patient was found to be tachycardic and had oxygen saturation of 96% on room air.  He had a respiration rate of 19 breaths per minute.  [facility] emergency department his heart rate increased to 115 breaths per minute with RR of 24.  He was also found to have elevated BNP and troponin.  Pulmonary embolism was ruled out.  He was admitted to ICU.  According to notes chest x-ray and CT are consistent with congestive heart failure and small bilateral pleural effusions. Initial EKG which was done at 1:40 p.m. shows diffuse nonspecific T-wave changes most obvious in V4 V5 and V6.  EKG repeated at 6:53 p.m. is the same without changes.  There are inverted T-waves in V4 V5 and V6.  Echocardiogram was done and showed an ejection fraction of 15-20%. Patient was transferred for cardiac work-up.    Past Medical History:   Diagnosis Date    Renal disorder        No past surgical history on file.    Review of patient's allergies indicates:   Allergen Reactions    Penicillins Swelling    Codeine Nausea Only       Current Facility-Administered Medications on File Prior to Encounter   Medication    [DISCONTINUED]  albuterol-ipratropium 2.5 mg-0.5 mg/3 mL nebulizer solution 3 mL    [DISCONTINUED] aspirin chewable tablet 81 mg    [DISCONTINUED] carvediloL tablet 3.125 mg    [DISCONTINUED] enoxaparin injection 80 mg    [DISCONTINUED] furosemide injection 40 mg    [DISCONTINUED] lisinopriL tablet 2.5 mg    [DISCONTINUED] sodium chloride 0.9% flush 10 mL     Current Outpatient Medications on File Prior to Encounter   Medication Sig    HYDROcodone-acetaminophen (NORCO)  mg per tablet Take 1 tablet by mouth every 8 (eight) hours as needed for Pain.    tamsulosin (FLOMAX) 0.4 mg Cap Take 1 capsule (0.4 mg total) by mouth once daily.     Family History    None       Tobacco Use    Smoking status: Every Day    Smokeless tobacco: Never   Substance and Sexual Activity    Alcohol use: Not Currently    Drug use: Not Currently     Types: Cocaine, Methamphetamines    Sexual activity: Yes     Review of Systems   Constitutional:  Positive for fatigue. Negative for appetite change, chills, diaphoresis and fever.   HENT:  Negative for congestion and facial swelling.    Eyes:  Negative for discharge.   Respiratory:  Positive for chest tightness and shortness of breath. Negative for apnea.    Cardiovascular:  Negative for chest pain, palpitations and leg swelling.   Gastrointestinal:  Negative for abdominal pain, constipation, diarrhea and nausea.   Genitourinary:  Negative for difficulty urinating.   Musculoskeletal:  Negative for back pain.   Neurological:  Positive for weakness. Negative for dizziness and light-headedness.   Psychiatric/Behavioral:  Negative for agitation.      Objective:     Vital Signs (Most Recent):  Temp: 97.6 °F (36.4 °C) (01/27/24 1115)  Pulse: (!) 113 (01/27/24 1606)  Resp: 17 (01/27/24 1115)  BP: (!) 134/91 (01/27/24 1115)  SpO2: 95 % (01/27/24 1115) Vital Signs (24h Range):  Temp:  [97.5 °F (36.4 °C)-97.7 °F (36.5 °C)] 97.6 °F (36.4 °C)  Pulse:  [110-117] 113  Resp:  [12-19] 17  SpO2:  [95 %-98 %] 95 %  BP:  (116-149)/(89-99) 134/91     Weight: 83.2 kg (183 lb 6.8 oz)  Body mass index is 23.55 kg/m².     Physical Exam  Constitutional:       Appearance: He is obese.   HENT:      Head: Normocephalic and atraumatic.      Nose: No congestion or rhinorrhea.      Mouth/Throat:      Pharynx: Oropharynx is clear.   Eyes:      Conjunctiva/sclera: Conjunctivae normal.   Cardiovascular:      Rate and Rhythm: Tachycardia present.      Pulses: Normal pulses.   Pulmonary:      Effort: Pulmonary effort is normal. No respiratory distress.      Comments: Course bs  Abdominal:      General: Bowel sounds are normal.      Palpations: Abdomen is soft.      Tenderness: There is no abdominal tenderness.   Musculoskeletal:         General: No swelling. Normal range of motion.      Cervical back: Normal range of motion and neck supple. No rigidity.      Right lower leg: No edema.      Left lower leg: No edema.   Skin:     General: Skin is warm.      Coloration: Skin is not jaundiced.   Neurological:      Mental Status: He is alert and oriented to person, place, and time. Mental status is at baseline.   Psychiatric:         Mood and Affect: Mood normal.                Significant Labs: All pertinent labs within the past 24 hours have been reviewed.    Significant Imaging: I have reviewed all pertinent imaging results/findings within the past 24 hours.  Assessment/Plan:     * Acute decompensated heart failure  -elevated troponin, continue with Lovenox  -continue with aspirin, carvedilol, lisinopril  -continue supplemental oxygen, wean as tolerated  -continuing with IV Lasix  -continue with strict urine output, low-sodium diet, daily weights  -cardiology consult placed    Dyspnea on exertion    -continue with supplemental oxygen  -continue with bronchodilators    COPD (chronic obstructive pulmonary disease)  Patient's COPD is controlled currently.  Patient is currently off COPD Pathway. Continue scheduled inhalers  not in excerebration and  Supplemental oxygen and monitor respiratory status closely.     Tobacco abuse  -counseled  -refused nicotine patch      Hypertension  Chronic, controlled. Latest blood pressure and vitals reviewed-     Temp:  [97.5 °F (36.4 °C)-97.7 °F (36.5 °C)]   Pulse:  [110-117]   Resp:  [12-19]   BP: (116-149)/(89-99)   SpO2:  [95 %-98 %] .   Home meds for hypertension were reviewed and noted below.       While in the hospital, will manage blood pressure as follows; Continue home antihypertensive regimen    Will utilize p.r.n. blood pressure medication only if patient's blood pressure greater than 160/100 and he develops symptoms such as worsening chest pain or shortness of breath.  -continue with Coreg, lisinopril  -low-sodium diet  -monitor vitals      VTE Risk Mitigation (From admission, onward)           Ordered     enoxaparin injection 80 mg  Every 12 hours         01/27/24 1447     Reason for No Pharmacological VTE Prophylaxis  Once        Question:  Reasons:  Answer:  Physician Provided (leave comment)  Comment:  already in anticoag    01/27/24 1447     IP VTE HIGH RISK PATIENT  Once         01/27/24 1447     Place sequential compression device  Until discontinued         01/27/24 1447                                    Fadi Fairbanks MD  Department of Hospital Medicine  [facility]

## 2024-01-27 NOTE — PLAN OF CARE
Problem: Adult Inpatient Plan of Care  Goal: Plan of Care Review  Outcome: Ongoing, Not Progressing  Goal: Absence of Hospital-Acquired Illness or Injury  Outcome: Ongoing, Not Progressing     Problem: Adjustment to Illness (Heart Failure)  Goal: Optimal Coping  Outcome: Ongoing, Not Progressing     Problem: Cardiac Output Decreased (Heart Failure)  Goal: Optimal Cardiac Output  Outcome: Ongoing, Not Progressing     Problem: Dysrhythmia (Heart Failure)  Goal: Stable Heart Rate and Rhythm  Outcome: Ongoing, Not Progressing     Problem: Fluid Imbalance (Heart Failure)  Goal: Fluid Balance  Outcome: Ongoing, Not Progressing     Problem: Functional Ability Impaired (Heart Failure)  Goal: Optimal Functional Ability  Outcome: Ongoing, Not Progressing     Problem: Oral Intake Inadequate (Heart Failure)  Goal: Optimal Nutrition Intake  Outcome: Ongoing, Not Progressing     Problem: Respiratory Compromise (Heart Failure)  Goal: Effective Oxygenation and Ventilation  Outcome: Ongoing, Not Progressing     Problem: Sleep Disordered Breathing (Heart Failure)  Goal: Effective Breathing Pattern During Sleep  Outcome: Ongoing, Not Progressing

## 2024-01-27 NOTE — PLAN OF CARE
Problem: Adult Inpatient Plan of Care  Goal: Plan of Care Review  Outcome: Ongoing, Progressing  Goal: Patient-Specific Goal (Individualized)  Outcome: Ongoing, Progressing  Goal: Absence of Hospital-Acquired Illness or Injury  Outcome: Ongoing, Progressing  Goal: Optimal Comfort and Wellbeing  Outcome: Ongoing, Progressing  Intervention: Monitor Pain and Promote Comfort  Flowsheets (Taken 1/27/2024 0138)  Pain Management Interventions:   quiet environment facilitated   care clustered  Goal: Readiness for Transition of Care  Outcome: Ongoing, Progressing     Problem: Adjustment to Illness (Heart Failure)  Goal: Optimal Coping  Outcome: Ongoing, Progressing  Intervention: Support Psychosocial Response  Flowsheets (Taken 1/27/2024 0138)  Supportive Measures: active listening utilized     Problem: Cardiac Output Decreased (Heart Failure)  Goal: Optimal Cardiac Output  Outcome: Ongoing, Progressing  Intervention: Optimize Cardiac Output  Flowsheets (Taken 1/27/2024 0138)  Environmental Support:   calm environment promoted   rest periods encouraged     Problem: Dysrhythmia (Heart Failure)  Goal: Stable Heart Rate and Rhythm  Outcome: Ongoing, Progressing

## 2024-01-27 NOTE — HPI
53-year-old male with a past medical history of drug abuse; was [facility] to [facility] after going to the hospital for shortness of breath for the past week.  The patient also complains of intermittent chest pain.  He decided to go into the clinic and was asked to come to the emergency department.  The patient was found to be tachycardic and had oxygen saturation of 96% on room air.  He had a respiration rate of 19 breaths per minute.  [facility] emergency department his heart rate increased to 115 breaths per minute with RR of 24.  He was also found to have elevated BNP and troponin.  Pulmonary embolism was ruled out.  He was admitted to ICU.  According to notes chest x-ray and CT are consistent with congestive heart failure and small bilateral pleural effusions. Initial EKG which was done at 1:40 p.m. shows diffuse nonspecific T-wave changes most obvious in V4 V5 and V6.  EKG repeated at 6:53 p.m. is the same without changes.  There are inverted T-waves in V4 V5 and V6.  Echocardiogram was done and showed an ejection fraction of 15-20%. Patient was transferred for cardiac work-up.

## 2024-01-27 NOTE — NURSING
Pt left room with IV / Telemetry box; dressed. Security & Charge nurse was notified.   Pt found downstairs in cafeteria and ambulated back to room. Pt advised against this behavior. Explained that he can not go downstairs to smoke / purchase food since physician has not seen him yet. Pt verbalized understanding.

## 2024-01-27 NOTE — ASSESSMENT & PLAN NOTE
Chronic, controlled. Latest blood pressure and vitals reviewed-     Temp:  [97.5 °F (36.4 °C)-97.7 °F (36.5 °C)]   Pulse:  [110-117]   Resp:  [12-19]   BP: (116-149)/(89-99)   SpO2:  [95 %-98 %] .   Home meds for hypertension were reviewed and noted below.       While in the hospital, will manage blood pressure as follows; Continue home antihypertensive regimen    Will utilize p.r.n. blood pressure medication only if patient's blood pressure greater than 160/100 and he develops symptoms such as worsening chest pain or shortness of breath.  -continue with Coreg, lisinopril  -low-sodium diet  -monitor vitals

## 2024-01-27 NOTE — ASSESSMENT & PLAN NOTE
-elevated troponin, continue with Lovenox  -continue with aspirin, carvedilol, lisinopril  -continue supplemental oxygen, wean as tolerated  -continuing with IV Lasix  -continue with strict urine output, low-sodium diet, daily weights  -cardiology consult placed

## 2024-01-27 NOTE — ASSESSMENT & PLAN NOTE
Patient's COPD is controlled currently.  Patient is currently off COPD Pathway. Continue scheduled inhalers  not in excerebration and Supplemental oxygen and monitor respiratory status closely.

## 2024-01-27 NOTE — SUBJECTIVE & OBJECTIVE
Past Medical History:   Diagnosis Date    Renal disorder        No past surgical history on file.    Review of patient's allergies indicates:   Allergen Reactions    Penicillins Swelling    Codeine Nausea Only       Current Facility-Administered Medications on File Prior to Encounter   Medication    [DISCONTINUED] albuterol-ipratropium 2.5 mg-0.5 mg/3 mL nebulizer solution 3 mL    [DISCONTINUED] aspirin chewable tablet 81 mg    [DISCONTINUED] carvediloL tablet 3.125 mg    [DISCONTINUED] enoxaparin injection 80 mg    [DISCONTINUED] furosemide injection 40 mg    [DISCONTINUED] lisinopriL tablet 2.5 mg    [DISCONTINUED] sodium chloride 0.9% flush 10 mL     Current Outpatient Medications on File Prior to Encounter   Medication Sig    HYDROcodone-acetaminophen (NORCO)  mg per tablet Take 1 tablet by mouth every 8 (eight) hours as needed for Pain.    tamsulosin (FLOMAX) 0.4 mg Cap Take 1 capsule (0.4 mg total) by mouth once daily.     Family History    None       Tobacco Use    Smoking status: Every Day    Smokeless tobacco: Never   Substance and Sexual Activity    Alcohol use: Not Currently    Drug use: Not Currently     Types: Cocaine, Methamphetamines    Sexual activity: Yes     Review of Systems   Constitutional:  Positive for fatigue. Negative for appetite change, chills, diaphoresis and fever.   HENT:  Negative for congestion and facial swelling.    Eyes:  Negative for discharge.   Respiratory:  Positive for chest tightness and shortness of breath. Negative for apnea.    Cardiovascular:  Negative for chest pain, palpitations and leg swelling.   Gastrointestinal:  Negative for abdominal pain, constipation, diarrhea and nausea.   Genitourinary:  Negative for difficulty urinating.   Musculoskeletal:  Negative for back pain.   Neurological:  Positive for weakness. Negative for dizziness and light-headedness.   Psychiatric/Behavioral:  Negative for agitation.      Objective:     Vital Signs (Most Recent):  Temp:  97.6 °F (36.4 °C) (01/27/24 1115)  Pulse: (!) 113 (01/27/24 1606)  Resp: 17 (01/27/24 1115)  BP: (!) 134/91 (01/27/24 1115)  SpO2: 95 % (01/27/24 1115) Vital Signs (24h Range):  Temp:  [97.5 °F (36.4 °C)-97.7 °F (36.5 °C)] 97.6 °F (36.4 °C)  Pulse:  [110-117] 113  Resp:  [12-19] 17  SpO2:  [95 %-98 %] 95 %  BP: (116-149)/(89-99) 134/91     Weight: 83.2 kg (183 lb 6.8 oz)  Body mass index is 23.55 kg/m².     Physical Exam  Constitutional:       Appearance: He is obese.   HENT:      Head: Normocephalic and atraumatic.      Nose: No congestion or rhinorrhea.      Mouth/Throat:      Pharynx: Oropharynx is clear.   Eyes:      Conjunctiva/sclera: Conjunctivae normal.   Cardiovascular:      Rate and Rhythm: Tachycardia present.      Pulses: Normal pulses.   Pulmonary:      Effort: Pulmonary effort is normal. No respiratory distress.      Comments: Course bs  Abdominal:      General: Bowel sounds are normal.      Palpations: Abdomen is soft.      Tenderness: There is no abdominal tenderness.   Musculoskeletal:         General: No swelling. Normal range of motion.      Cervical back: Normal range of motion and neck supple. No rigidity.      Right lower leg: No edema.      Left lower leg: No edema.   Skin:     General: Skin is warm.      Coloration: Skin is not jaundiced.   Neurological:      Mental Status: He is alert and oriented to person, place, and time. Mental status is at baseline.   Psychiatric:         Mood and Affect: Mood normal.                Significant Labs: All pertinent labs within the past 24 hours have been reviewed.    Significant Imaging: I have reviewed all pertinent imaging results/findings within the past 24 hours.

## 2024-01-28 LAB
ANION GAP SERPL CALC-SCNC: 10 MMOL/L (ref 8–16)
BUN SERPL-MCNC: 18 MG/DL (ref 6–20)
CALCIUM SERPL-MCNC: 9.6 MG/DL (ref 8.7–10.5)
CHLORIDE SERPL-SCNC: 101 MMOL/L (ref 95–110)
CO2 SERPL-SCNC: 25 MMOL/L (ref 23–29)
CREAT SERPL-MCNC: 1.1 MG/DL (ref 0.5–1.4)
ERYTHROCYTE [DISTWIDTH] IN BLOOD BY AUTOMATED COUNT: 14.3 % (ref 11.5–14.5)
EST. GFR  (NO RACE VARIABLE): >60 ML/MIN/1.73 M^2
GLUCOSE SERPL-MCNC: 101 MG/DL (ref 70–110)
HCT VFR BLD AUTO: 53.5 % (ref 40–54)
HGB BLD-MCNC: 19 G/DL (ref 14–18)
MAGNESIUM SERPL-MCNC: 1.7 MG/DL (ref 1.6–2.6)
MCH RBC QN AUTO: 30.5 PG (ref 27–31)
MCHC RBC AUTO-ENTMCNC: 35.5 G/DL (ref 32–36)
MCV RBC AUTO: 86 FL (ref 82–98)
PHOSPHATE SERPL-MCNC: 3.9 MG/DL (ref 2.7–4.5)
PLATELET # BLD AUTO: 365 K/UL (ref 150–450)
PMV BLD AUTO: 9.6 FL (ref 9.2–12.9)
POTASSIUM SERPL-SCNC: 4.9 MMOL/L (ref 3.5–5.1)
RBC # BLD AUTO: 6.23 M/UL (ref 4.6–6.2)
SODIUM SERPL-SCNC: 136 MMOL/L (ref 136–145)
T4 FREE SERPL-MCNC: 0.95 NG/DL (ref 0.71–1.51)
TROPONIN I SERPL DL<=0.01 NG/ML-MCNC: 0.17 NG/ML (ref 0–0.03)
TSH SERPL DL<=0.005 MIU/L-ACNC: 0.38 UIU/ML (ref 0.4–4)
WBC # BLD AUTO: 10.16 K/UL (ref 3.9–12.7)

## 2024-01-28 PROCEDURE — 80048 BASIC METABOLIC PNL TOTAL CA: CPT | Performed by: INTERNAL MEDICINE

## 2024-01-28 PROCEDURE — 99223 1ST HOSP IP/OBS HIGH 75: CPT | Mod: ,,, | Performed by: STUDENT IN AN ORGANIZED HEALTH CARE EDUCATION/TRAINING PROGRAM

## 2024-01-28 PROCEDURE — 84484 ASSAY OF TROPONIN QUANT: CPT | Performed by: INTERNAL MEDICINE

## 2024-01-28 PROCEDURE — 84443 ASSAY THYROID STIM HORMONE: CPT | Performed by: INTERNAL MEDICINE

## 2024-01-28 PROCEDURE — 84439 ASSAY OF FREE THYROXINE: CPT | Performed by: INTERNAL MEDICINE

## 2024-01-28 PROCEDURE — 99900035 HC TECH TIME PER 15 MIN (STAT)

## 2024-01-28 PROCEDURE — 83735 ASSAY OF MAGNESIUM: CPT | Performed by: INTERNAL MEDICINE

## 2024-01-28 PROCEDURE — 84100 ASSAY OF PHOSPHORUS: CPT | Performed by: INTERNAL MEDICINE

## 2024-01-28 PROCEDURE — 85027 COMPLETE CBC AUTOMATED: CPT | Performed by: INTERNAL MEDICINE

## 2024-01-28 PROCEDURE — 27000221 HC OXYGEN, UP TO 24 HOURS

## 2024-01-28 PROCEDURE — 25000242 PHARM REV CODE 250 ALT 637 W/ HCPCS: Performed by: INTERNAL MEDICINE

## 2024-01-28 PROCEDURE — 94761 N-INVAS EAR/PLS OXIMETRY MLT: CPT

## 2024-01-28 PROCEDURE — 94640 AIRWAY INHALATION TREATMENT: CPT

## 2024-01-28 PROCEDURE — 36415 COLL VENOUS BLD VENIPUNCTURE: CPT | Performed by: INTERNAL MEDICINE

## 2024-01-28 PROCEDURE — 25000003 PHARM REV CODE 250: Performed by: INTERNAL MEDICINE

## 2024-01-28 PROCEDURE — 63600175 PHARM REV CODE 636 W HCPCS: Performed by: INTERNAL MEDICINE

## 2024-01-28 PROCEDURE — 11000001 HC ACUTE MED/SURG PRIVATE ROOM

## 2024-01-28 RX ADMIN — ENOXAPARIN SODIUM 80 MG: 80 INJECTION SUBCUTANEOUS at 02:01

## 2024-01-28 RX ADMIN — TAMSULOSIN HYDROCHLORIDE 0.4 MG: 0.4 CAPSULE ORAL at 08:01

## 2024-01-28 RX ADMIN — ENOXAPARIN SODIUM 80 MG: 80 INJECTION SUBCUTANEOUS at 04:01

## 2024-01-28 RX ADMIN — IPRATROPIUM BROMIDE AND ALBUTEROL SULFATE 3 ML: 2.5; .5 SOLUTION RESPIRATORY (INHALATION) at 07:01

## 2024-01-28 RX ADMIN — FUROSEMIDE 40 MG: 10 INJECTION, SOLUTION INTRAVENOUS at 08:01

## 2024-01-28 RX ADMIN — CARVEDILOL 3.12 MG: 3.12 TABLET, FILM COATED ORAL at 09:01

## 2024-01-28 RX ADMIN — LISINOPRIL 2.5 MG: 2.5 TABLET ORAL at 08:01

## 2024-01-28 RX ADMIN — ASPIRIN 81 MG CHEWABLE TABLET 81 MG: 81 TABLET CHEWABLE at 08:01

## 2024-01-28 RX ADMIN — CARVEDILOL 3.12 MG: 3.12 TABLET, FILM COATED ORAL at 08:01

## 2024-01-28 RX ADMIN — FUROSEMIDE 40 MG: 10 INJECTION, SOLUTION INTRAVENOUS at 09:01

## 2024-01-28 NOTE — ASSESSMENT & PLAN NOTE
Chronic, controlled. Latest blood pressure and vitals reviewed-     Temp:  [97.2 °F (36.2 °C)-98 °F (36.7 °C)]   Pulse:  [102-114]   Resp:  [18-22]   BP: (121-147)/(85-95)   SpO2:  [94 %-100 %] .   Home meds for hypertension were reviewed and noted below.     While in the hospital, will manage blood pressure as follows; Continue home antihypertensive regimen    Will utilize p.r.n. blood pressure medication only if patient's blood pressure greater than 160/100 and he develops symptoms such as worsening chest pain or shortness of breath.  -continue with Coreg, Lisinopril and Lasix  -low-sodium diet  -monitor vitals

## 2024-01-28 NOTE — PROGRESS NOTES
[facility] Medicine  Progress Note    Patient Name: Yeison Manuel  MRN: [id number]  Patient Class: IP- Inpatient   Admission Date: 1/27/2024  Length of Stay: 1 days  Attending Physician: Fadi Fairbanks MD  Primary Care Provider: Leora, Primary Doctor        Subjective:     Principal Problem:Acute decompensated heart failure        HPI:   53-year-old male with a past medical history of drug abuse; was transferred to [facility] from [facility] after going to the hospital for shortness of breath for the past week.  The patient also complains of intermittent chest pain.  He decided to go into the clinic and was asked to come to the emergency department.  The patient was found to be tachycardic and had oxygen saturation of 96% on room air.  He had a respiration rate of 19 breaths per minute.  [facility] emergency department his heart rate increased to 115 breaths per minute with RR of 24.  He was also found to have elevated BNP and troponin.  Pulmonary embolism was ruled out.  He was admitted to ICU.  According to notes chest x-ray and CT are consistent with congestive heart failure and small bilateral pleural effusions. Initial EKG which was done at 1:40 p.m. shows diffuse nonspecific T-wave changes most obvious in V4 V5 and V6.  EKG repeated at 6:53 p.m. is the same without changes.  There are inverted T-waves in V4 V5 and V6.  Echocardiogram was done and showed an ejection fraction of 15-20%. Patient was transferred for cardiac work-up.    Overview/Hospital Course:  No notes on file    Interval History:  Patient was examined in his bed.  He was asleep when I walked in but easily arousable.  He denies chest pain, palpitation and states his shortness of breath is improving.  His only complaint is hunger.    Review of Systems   Constitutional:  Positive for fatigue. Negative for appetite change, chills, diaphoresis and fever.   HENT:  Negative for congestion and facial swelling.    Eyes:   Negative for discharge.   Respiratory:  Positive for shortness of breath. Negative for apnea and chest tightness.    Cardiovascular:  Negative for chest pain, palpitations and leg swelling.   Gastrointestinal:  Negative for abdominal pain, constipation, diarrhea and nausea.   Genitourinary:  Negative for difficulty urinating.   Musculoskeletal:  Negative for back pain.   Neurological:  Positive for weakness. Negative for dizziness and light-headedness.   Psychiatric/Behavioral:  Negative for agitation.      Objective:     Vital Signs (Most Recent):  Temp: 97.2 °F (36.2 °C) (01/28/24 1120)  Pulse: 110 (01/28/24 1203)  Resp: 18 (01/28/24 1120)  BP: 121/85 (01/28/24 1120)  SpO2: 97 % (01/28/24 1200) Vital Signs (24h Range):  Temp:  [97.2 °F (36.2 °C)-98 °F (36.7 °C)] 97.2 °F (36.2 °C)  Pulse:  [102-114] 110  Resp:  [18-22] 18  SpO2:  [94 %-100 %] 97 %  BP: (121-147)/(85-95) 121/85     Weight: 80.4 kg (177 lb 4 oz)  Body mass index is 22.76 kg/m².    Intake/Output Summary (Last 24 hours) at 1/28/2024 1346  Last data filed at 1/28/2024 0539  Gross per 24 hour   Intake 175 ml   Output 1475 ml   Net -1300 ml         Physical Exam  HENT:      Head: Normocephalic and atraumatic.      Nose: No congestion or rhinorrhea.      Mouth/Throat:      Pharynx: Oropharynx is clear.   Eyes:      Conjunctiva/sclera: Conjunctivae normal.   Cardiovascular:      Rate and Rhythm: Tachycardia present.      Pulses: Normal pulses.   Pulmonary:      Effort: Pulmonary effort is normal. No respiratory distress.      Comments: Course bs  Abdominal:      General: Bowel sounds are normal.      Palpations: Abdomen is soft.      Tenderness: There is no abdominal tenderness.   Musculoskeletal:         General: No swelling. Normal range of motion.      Cervical back: Normal range of motion and neck supple. No rigidity.      Right lower leg: No edema.      Left lower leg: No edema.   Skin:     General: Skin is warm.      Coloration: Skin is not jaundiced.    Neurological:      Mental Status: He is alert and oriented to person, place, and time. Mental status is at baseline.      Motor: Weakness present.   Psychiatric:         Mood and Affect: Mood normal.             Significant Labs: All pertinent labs within the past 24 hours have been reviewed.    Significant Imaging: I have reviewed all pertinent imaging results/findings within the past 24 hours.    Assessment/Plan:      * Acute decompensated heart failure  -elevated troponin, continue with Lovenox  -continue with aspirin, carvedilol, lisinopril  -continue supplemental oxygen, wean as tolerated  -continuing with IV Lasix  -continue with strict urine output, low-sodium diet, daily weights  -cardiology following    Dyspnea on exertion    -continue with supplemental oxygen  -continue with bronchodilators    COPD (chronic obstructive pulmonary disease)  Patient's COPD is controlled currently.  Patient is currently off COPD Pathway. Continue scheduled inhalers  not in excerebration and Supplemental oxygen and monitor respiratory status closely.     Tobacco abuse  -counseled  -refused nicotine patch      Hypertension  Chronic, controlled. Latest blood pressure and vitals reviewed-     Temp:  [97.2 °F (36.2 °C)-98 °F (36.7 °C)]   Pulse:  [102-114]   Resp:  [18-22]   BP: (121-147)/(85-95)   SpO2:  [94 %-100 %] .   Home meds for hypertension were reviewed and noted below.     While in the hospital, will manage blood pressure as follows; Continue home antihypertensive regimen    Will utilize p.r.n. blood pressure medication only if patient's blood pressure greater than 160/100 and he develops symptoms such as worsening chest pain or shortness of breath.  -continue with Coreg, Lisinopril and Lasix  -low-sodium diet  -monitor vitals      VTE Risk Mitigation (From admission, onward)           Ordered     enoxaparin injection 80 mg  Every 12 hours         01/27/24 5625     Reason for No Pharmacological VTE Prophylaxis  Once         Question:  Reasons:  Answer:  Physician Provided (leave comment)  Comment:  already in anticoag    01/27/24 1447     IP VTE HIGH RISK PATIENT  Once         01/27/24 1447     Place sequential compression device  Until discontinued         01/27/24 1447                    Discharge Planning   MARITO:      Code Status: Full Code   Is the patient medically ready for discharge?:     Reason for patient still in hospital (select all that apply): Patient trending condition and Consult recommendations                     Fadi Fairbanks MD  Department of [facility] Medicine   [facility]

## 2024-01-28 NOTE — SUBJECTIVE & OBJECTIVE
Interval History:  Patient was examined in his bed.  He was asleep when I walked in but easily arousable.  He denies chest pain, palpitation and states his shortness of breath is improving.  His only complaint is hunger.    Review of Systems   Constitutional:  Positive for fatigue. Negative for appetite change, chills, diaphoresis and fever.   HENT:  Negative for congestion and facial swelling.    Eyes:  Negative for discharge.   Respiratory:  Positive for shortness of breath. Negative for apnea and chest tightness.    Cardiovascular:  Negative for chest pain, palpitations and leg swelling.   Gastrointestinal:  Negative for abdominal pain, constipation, diarrhea and nausea.   Genitourinary:  Negative for difficulty urinating.   Musculoskeletal:  Negative for back pain.   Neurological:  Positive for weakness. Negative for dizziness and light-headedness.   Psychiatric/Behavioral:  Negative for agitation.      Objective:     Vital Signs (Most Recent):  Temp: 97.2 °F (36.2 °C) (01/28/24 1120)  Pulse: 110 (01/28/24 1203)  Resp: 18 (01/28/24 1120)  BP: 121/85 (01/28/24 1120)  SpO2: 97 % (01/28/24 1200) Vital Signs (24h Range):  Temp:  [97.2 °F (36.2 °C)-98 °F (36.7 °C)] 97.2 °F (36.2 °C)  Pulse:  [102-114] 110  Resp:  [18-22] 18  SpO2:  [94 %-100 %] 97 %  BP: (121-147)/(85-95) 121/85     Weight: 80.4 kg (177 lb 4 oz)  Body mass index is 22.76 kg/m².    Intake/Output Summary (Last 24 hours) at 1/28/2024 1346  Last data filed at 1/28/2024 0539  Gross per 24 hour   Intake 175 ml   Output 1475 ml   Net -1300 ml         Physical Exam  HENT:      Head: Normocephalic and atraumatic.      Nose: No congestion or rhinorrhea.      Mouth/Throat:      Pharynx: Oropharynx is clear.   Eyes:      Conjunctiva/sclera: Conjunctivae normal.   Cardiovascular:      Rate and Rhythm: Tachycardia present.      Pulses: Normal pulses.   Pulmonary:      Effort: Pulmonary effort is normal. No respiratory distress.      Comments: Course bs  Abdominal:       General: Bowel sounds are normal.      Palpations: Abdomen is soft.      Tenderness: There is no abdominal tenderness.   Musculoskeletal:         General: No swelling. Normal range of motion.      Cervical back: Normal range of motion and neck supple. No rigidity.      Right lower leg: No edema.      Left lower leg: No edema.   Skin:     General: Skin is warm.      Coloration: Skin is not jaundiced.   Neurological:      Mental Status: He is alert and oriented to person, place, and time. Mental status is at baseline.      Motor: Weakness present.   Psychiatric:         Mood and Affect: Mood normal.             Significant Labs: All pertinent labs within the past 24 hours have been reviewed.    Significant Imaging: I have reviewed all pertinent imaging results/findings within the past 24 hours.

## 2024-01-28 NOTE — CONSULTS
Cardiology    Reason for Consult: HF    SUBJECTIVE:     History of Present Illness:  Patient is a 53 y.o. male presents with pmhx of drug abuse presented initially to [facility] for SOB and intermittent chest pressure-was asked to go to the ER. Upon arrival he was found tachycardic, tachypneic, HR 115s and found to have a elevated trops (mild) and CXR with congestion and bilateral pleural effusion. ECG with LVH w repol.       Review of patient's allergies indicates:   Allergen Reactions    Penicillins Swelling    Codeine Nausea Only       Past Medical History:   Diagnosis Date    Renal disorder      No past surgical history on file.  No family history on file.  Social History     Tobacco Use    Smoking status: Every Day    Smokeless tobacco: Never   Substance Use Topics    Alcohol use: Not Currently    Drug use: Not Currently     Types: Cocaine, Methamphetamines        Home meds:  No current facility-administered medications on file prior to encounter.     Current Outpatient Medications on File Prior to Encounter   Medication Sig Dispense Refill    HYDROcodone-acetaminophen (NORCO)  mg per tablet Take 1 tablet by mouth every 8 (eight) hours as needed for Pain. 10 tablet 0    tamsulosin (FLOMAX) 0.4 mg Cap Take 1 capsule (0.4 mg total) by mouth once daily. 10 capsule 0       Current meds:  Scheduled Meds:   aspirin  81 mg Oral Daily    carvediloL  3.125 mg Oral BID    enoxparin  1 mg/kg Subcutaneous Q12H (treatment, non-standard time)    furosemide (LASIX) injection  40 mg Intravenous BID    lisinopriL  2.5 mg Oral Daily    tamsulosin  0.4 mg Oral Daily     Continuous Infusions:  PRN Meds:.acetaminophen, albuterol-ipratropium, dextrose 10%, dextrose 10%, glucagon (human recombinant), glucose, glucose, naloxone, ondansetron, sodium chloride 0.9%, sodium chloride 0.9%      OBJECTIVE:     Vital Signs (Most Recent)  Temp: 97.2 °F (36.2 °C) (01/28/24 1120)  Pulse: 110 (01/28/24 1203)  Resp: 18 (01/28/24  "1120)  BP: 121/85 (01/28/24 1120)  SpO2: 97 % (01/28/24 1200)    Vital Signs Range (Last 24H):  Temp:  [97.2 °F (36.2 °C)-98 °F (36.7 °C)]   Pulse:  [102-114]   Resp:  [18-22]   BP: (121-147)/(85-95)   SpO2:  [94 %-100 %]     Physical Exam:  General: No acute stress  HENT: EOM intact, PERRL, oropharynx clear, mucous membranes clear and moist   Pulmonary: +crackles  Cardiovascular: tachycardic  Abdomen: soft, non-tender, no distended no splenomegaly or hepatomegaly   Skin: warm, dry, no erythema, no rashes    Extremities: periph pulses intact, no cyanosis or edema   Neuro: a/ox4, clear speech, follows commands, no weakness or focal neurologic  deficit   Psych: mood and behavior normal       Laboratory:  LABS  CBC  Recent Labs   Lab 01/26/24  0423 01/27/24  0516 01/28/24  0311   WBC 9.69 10.13 10.16   RBC 5.33 5.91 6.23*   HGB 16.2 17.8 19.0*   HCT 45.7 50.8 53.5    371 365   MCV 86 86 86   MCH 30.4 30.1 30.5   MCHC 35.4 35.0 35.5     BMP  Recent Labs   Lab 01/26/24  0423 01/27/24  0516 01/28/24  0311    136 136   K 3.9 4.1 4.9   CO2 22* 21* 25    104 101   BUN 13 13 18   CREATININE 0.9 1.0 1.1   GLU 99 109 101       Recent Labs   Lab 01/26/24  0423 01/27/24  0516 01/28/24  0311   CALCIUM 8.8 8.9 9.6   MG 1.7 1.9 1.7   PHOS 4.3  --  3.9       LFT  Recent Labs   Lab 01/25/24  1400 01/27/24  0516   PROT 7.7 7.1   ALBUMIN 3.9 3.6   BILITOT 0.8 0.9   AST 24 20   ALKPHOS 104 108   ALT 22 17       COAGS  No results for input(s): "PT", "INR", "APTT" in the last 168 hours.  CE  Recent Labs   Lab 01/25/24  1904 01/26/24  0423 01/28/24  0311   TROPONINI 0.206* 0.189* 0.165*     BNP  Recent Labs   Lab 01/25/24  1400   BNP 1,854*  1,854*     Lipid panel:  No results found for: "CHOL"  No results found for: "HDL"  No results found for: "LDLCALC"  No results found for: "TRIG"  No results found for: "CHOLHDL"  Diagnostic Results:        Chart review:  Echo:   Left Ventricle: The left ventricle is moderately " dilated. Mildly increased wall thickness. Severe global hypokinesis present. There is severely reduced systolic function with a visually estimated ejection fraction of 15 - 20%. Ejection fraction by visual approximation is 20%. Global longitudinal strain is -4$. Severly reduced. There is diastolic dysfunction.    Right Ventricle: Normal right ventricular cavity size. Systolic function is severely reduced. TAPSE is 1.40 cm.    Left Atrium: Left atrium is moderately dilated.    Right Atrium: Right atrium is mildly dilated.    Aortic Valve: The aortic valve is a trileaflet valve.    Mitral Valve: There is moderate to severe regurgitation.    IVC/SVC: Normal venous pressure at 3 mmHg.    Pericardium: There is a small effusion. No indication of cardiac tamponade.     ASSESSMENT/PLAN:   Patient is a 53 y.o. male presents with pmhx of drug abuse presented initially to [facility] for SOB found to have new onset of HF.    #New Onset of HF  - admission BNP elevated. Trops flat. CXR evidence of pulmonary congestion. ECG with LVH and strain pattern.  - Volume: Lasix 40 mg IV BID   - BB: continue Coreg 3.125mg BID  - ACE/ARB/ARNI Continue lisinopril 2.5  - AA: will initiated as an outpatient  - Monitor daily weights  - Strict I/Os, fluid restriction; K>4, Mg>2  - TTE pending final read  -Once he is close to euvolemia will discuss ischemic workup likely a stress test.    HTN- as above continue current regimen        Cullen Gilmore MD

## 2024-01-28 NOTE — ASSESSMENT & PLAN NOTE
-elevated troponin, continue with Lovenox  -continue with aspirin, carvedilol, lisinopril  -continue supplemental oxygen, wean as tolerated  -continuing with IV Lasix  -continue with strict urine output, low-sodium diet, daily weights  -cardiology following

## 2024-01-29 LAB
ANION GAP SERPL CALC-SCNC: 12 MMOL/L (ref 8–16)
AORTIC ROOT ANNULUS: 3.77 CM
AORTIC VALVE CUSP SEPERATION: 2.3 CM
ASCENDING AORTA: 2.99 CM
AV INDEX (PROSTH): 0.63
AV MEAN GRADIENT: 2 MMHG
AV PEAK GRADIENT: 3 MMHG
AV REGURGITATION PRESSURE HALF TIME: 119.18 MS
AV VALVE AREA BY VELOCITY RATIO: 2.8 CM²
AV VALVE AREA: 2.42 CM²
AV VELOCITY RATIO: 0.73
BSA FOR ECHO PROCEDURE: 2.1 M2
BUN SERPL-MCNC: 21 MG/DL (ref 6–20)
CALCIUM SERPL-MCNC: 9.3 MG/DL (ref 8.7–10.5)
CHLORIDE SERPL-SCNC: 99 MMOL/L (ref 95–110)
CO2 SERPL-SCNC: 25 MMOL/L (ref 23–29)
CREAT SERPL-MCNC: 1.3 MG/DL (ref 0.5–1.4)
CV ECHO LV RWT: 0.44 CM
DOP CALC AO PEAK VEL: 0.8 M/S
DOP CALC AO VTI: 12.3 CM
DOP CALC LVOT AREA: 3.9 CM2
DOP CALC LVOT DIAMETER: 2.22 CM
DOP CALC LVOT PEAK VEL: 0.58 M/S
DOP CALC LVOT STROKE VOLUME: 29.79 CM3
DOP CALC MV VTI: 27.6 CM
DOP CALCLVOT PEAK VEL VTI: 7.7 CM
E WAVE DECELERATION TIME: 177.61 MSEC
E/A RATIO: 1
E/E' RATIO: 16 M/S
ECHO LV POSTERIOR WALL: 1.42 CM (ref 0.6–1.1)
EJECTION FRACTION: 20 %
ERYTHROCYTE [DISTWIDTH] IN BLOOD BY AUTOMATED COUNT: 14.4 % (ref 11.5–14.5)
EST. GFR  (NO RACE VARIABLE): >60 ML/MIN/1.73 M^2
FRACTIONAL SHORTENING: 12 % (ref 28–44)
GLOBAL LONGITUIDAL STRAIN: -4 %
GLUCOSE SERPL-MCNC: 128 MG/DL (ref 70–110)
HCT VFR BLD AUTO: 55.4 % (ref 40–54)
HGB BLD-MCNC: 19.4 G/DL (ref 14–18)
INTERVENTRICULAR SEPTUM: 1.43 CM (ref 0.6–1.1)
IVC DIAMETER: 2.14 CM
LA MAJOR: 5.13 CM
LA MINOR: 3.72 CM
LEFT ATRIUM SIZE: 5.25 CM
LEFT ATRIUM VOLUME INDEX MOD: 17.5 ML/M2
LEFT ATRIUM VOLUME MOD: 36.92 CM3
LEFT INTERNAL DIMENSION IN SYSTOLE: 5.68 CM (ref 2.1–4)
LEFT VENTRICLE DIASTOLIC VOLUME INDEX: 99.9 ML/M2
LEFT VENTRICLE DIASTOLIC VOLUME: 210.79 ML
LEFT VENTRICLE MASS INDEX: 211 G/M2
LEFT VENTRICLE SYSTOLIC VOLUME INDEX: 75.3 ML/M2
LEFT VENTRICLE SYSTOLIC VOLUME: 158.93 ML
LEFT VENTRICULAR INTERNAL DIMENSION IN DIASTOLE: 6.43 CM (ref 3.5–6)
LEFT VENTRICULAR MASS: 444.33 G
LV LATERAL E/E' RATIO: 13 M/S
LV SEPTAL E/E' RATIO: 20.8 M/S
LVOT MG: 0.77 MMHG
LVOT MV: 0.41 CM/S
MAGNESIUM SERPL-MCNC: 1.8 MG/DL (ref 1.6–2.6)
MCH RBC QN AUTO: 30.2 PG (ref 27–31)
MCHC RBC AUTO-ENTMCNC: 35 G/DL (ref 32–36)
MCV RBC AUTO: 86 FL (ref 82–98)
MV MEAN GRADIENT: 3 MMHG
MV PEAK A VEL: 1.04 M/S
MV PEAK E VEL: 1.04 M/S
MV PEAK GRADIENT: 8 MMHG
MV STENOSIS PRESSURE HALF TIME: 51.51 MS
MV VALVE AREA BY CONTINUITY EQUATION: 1.08 CM2
MV VALVE AREA P 1/2 METHOD: 4.27 CM2
PISA AR MAX VEL: 1.22 M/S
PISA MRMAX VEL: 5.03 M/S
PISA TR MAX VEL: 1.43 M/S
PLATELET # BLD AUTO: 382 K/UL (ref 150–450)
PMV BLD AUTO: 9.6 FL (ref 9.2–12.9)
POTASSIUM SERPL-SCNC: 4.3 MMOL/L (ref 3.5–5.1)
PV MV: 0.45 M/S
PV PEAK GRADIENT: 1 MMHG
PV PEAK VELOCITY: 0.57 M/S
RA MAJOR: 4.52 CM
RA PRESSURE ESTIMATED: 3 MMHG
RA WIDTH: 3.83 CM
RBC # BLD AUTO: 6.42 M/UL (ref 4.6–6.2)
RIGHT VENTRICULAR END-DIASTOLIC DIMENSION: 3.71 CM
RIGHT VENTRICULAR LENGTH IN DIASTOLE (APICAL 4-CHAMBER VIEW): 7.78 CM
RV MID DIAMA: 2.56 CM
RV TB RVSP: 4 MMHG
SODIUM SERPL-SCNC: 136 MMOL/L (ref 136–145)
STJ: 3.21 CM
TDI LATERAL: 0.08 M/S
TDI SEPTAL: 0.05 M/S
TDI: 0.07 M/S
TR MAX PG: 8 MMHG
TRICUSPID ANNULAR PLANE SYSTOLIC EXCURSION: 1.4 CM
TRICUSPID VALVE PEAK A WAVE VELOCITY: 0.34 M/S
TV PEAK E VEL: 0.7 M/S
TV REST PULMONARY ARTERY PRESSURE: 11 MMHG
WBC # BLD AUTO: 9.42 K/UL (ref 3.9–12.7)
Z-SCORE OF LEFT VENTRICULAR DIMENSION IN END DIASTOLE: -0.28
Z-SCORE OF LEFT VENTRICULAR DIMENSION IN END SYSTOLE: 2.7

## 2024-01-29 PROCEDURE — 80048 BASIC METABOLIC PNL TOTAL CA: CPT | Performed by: INTERNAL MEDICINE

## 2024-01-29 PROCEDURE — 25000003 PHARM REV CODE 250: Performed by: INTERNAL MEDICINE

## 2024-01-29 PROCEDURE — 99900035 HC TECH TIME PER 15 MIN (STAT)

## 2024-01-29 PROCEDURE — 11000001 HC ACUTE MED/SURG PRIVATE ROOM

## 2024-01-29 PROCEDURE — 83735 ASSAY OF MAGNESIUM: CPT | Performed by: INTERNAL MEDICINE

## 2024-01-29 PROCEDURE — 99233 SBSQ HOSP IP/OBS HIGH 50: CPT | Mod: ,,, | Performed by: NURSE PRACTITIONER

## 2024-01-29 PROCEDURE — 85027 COMPLETE CBC AUTOMATED: CPT | Performed by: INTERNAL MEDICINE

## 2024-01-29 PROCEDURE — 94761 N-INVAS EAR/PLS OXIMETRY MLT: CPT

## 2024-01-29 PROCEDURE — 27000221 HC OXYGEN, UP TO 24 HOURS

## 2024-01-29 PROCEDURE — 63600175 PHARM REV CODE 636 W HCPCS: Performed by: INTERNAL MEDICINE

## 2024-01-29 PROCEDURE — 36415 COLL VENOUS BLD VENIPUNCTURE: CPT | Performed by: INTERNAL MEDICINE

## 2024-01-29 RX ADMIN — LISINOPRIL 2.5 MG: 2.5 TABLET ORAL at 08:01

## 2024-01-29 RX ADMIN — FUROSEMIDE 40 MG: 10 INJECTION, SOLUTION INTRAVENOUS at 09:01

## 2024-01-29 RX ADMIN — CARVEDILOL 3.12 MG: 3.12 TABLET, FILM COATED ORAL at 09:01

## 2024-01-29 RX ADMIN — CARVEDILOL 3.12 MG: 3.12 TABLET, FILM COATED ORAL at 08:01

## 2024-01-29 RX ADMIN — ASPIRIN 81 MG CHEWABLE TABLET 81 MG: 81 TABLET CHEWABLE at 08:01

## 2024-01-29 RX ADMIN — TAMSULOSIN HYDROCHLORIDE 0.4 MG: 0.4 CAPSULE ORAL at 08:01

## 2024-01-29 RX ADMIN — FUROSEMIDE 40 MG: 10 INJECTION, SOLUTION INTRAVENOUS at 08:01

## 2024-01-29 RX ADMIN — ENOXAPARIN SODIUM 80 MG: 80 INJECTION SUBCUTANEOUS at 03:01

## 2024-01-29 NOTE — HOSPITAL COURSE
01/28/2024 Patient is a 53 y.o. male presents with pmhx of drug abuse presented initially to [facility] for SOB and intermittent chest pressure-was asked to go to the ER. Upon arrival he was found tachycardic, tachypneic, HR 115s and found to have a elevated trops (mild) and CXR with congestion and bilateral pleural effusion. ECG with LVH w repol.     01/29/2024 No CP overnight. SOB is improving. Diuresing well with IV Lasix. TTE pending.

## 2024-01-29 NOTE — ASSESSMENT & PLAN NOTE
TTE    Left Ventricle: The left ventricle is moderately dilated. Moderately   increased wall thickness. Severe global hypokinesis present. There is   severely reduced systolic function with a visually estimated ejection   fraction of 15 - 20%. Ejection fraction by visual approximation is 20%.   Global longitudinal strain is -4%. Severly reduced. There is diastolic   dysfunction.    Right Ventricle: Normal right ventricular cavity size. Systolic   function is severely reduced. TAPSE is 1.40 cm.    Left Atrium: Left atrium is moderately dilated.    Right Atrium: Right atrium is mildly dilated.    Aortic Valve: The aortic valve is a trileaflet valve.    Mitral Valve: There is moderate to severe regurgitation.    IVC/SVC: Normal venous pressure at 3 mmHg.    Pericardium: There is a small effusion. No indication of cardiac   tamponade.    Troponin flat 0.1-0.2, NonACS trend- likely demand in setting of Methamphetamine use   EKG without acute ischemic changes  On GDMT with BB and ACEI  Adequate output with IV Lasix  Recommend ambulation around nursing station and if feeling back to baseline, transition to Lasix 40 mg p.o. daily  Will need close follow up with cardiology   PET stress to be planned as OP

## 2024-01-29 NOTE — PLAN OF CARE
Problem: Adult Inpatient Plan of Care  Goal: Plan of Care Review  Outcome: Ongoing, Progressing     Problem: Adjustment to Illness (Heart Failure)  Goal: Optimal Coping  Outcome: Ongoing, Progressing     Problem: Cardiac Output Decreased (Heart Failure)  Goal: Optimal Cardiac Output  Outcome: Ongoing, Progressing     Problem: Dysrhythmia (Heart Failure)  Goal: Stable Heart Rate and Rhythm  Outcome: Ongoing, Progressing     Problem: Fluid Imbalance (Heart Failure)  Goal: Fluid Balance  Outcome: Ongoing, Progressing     Problem: Functional Ability Impaired (Heart Failure)  Goal: Optimal Functional Ability  Outcome: Ongoing, Progressing     Problem: Respiratory Compromise (Heart Failure)  Goal: Effective Oxygenation and Ventilation  Outcome: Ongoing, Progressing

## 2024-01-29 NOTE — PROGRESS NOTES
[facility] - Telemetry  Cardiology  Progress Note    Patient Name: Yeison Manuel  MRN: [id number]  Admission Date: 1/27/2024  Hospital Length of Stay: 2 days  Code Status: Full Code   Attending Physician: Arabella Jama MD   Primary Care Physician: Leora, Primary Doctor  Expected Discharge Date:   Principal Problem:Acute decompensated heart failure    Subjective:     Hospital Course:   01/28/2024 Patient is a 53 y.o. male presents with pmhx of drug abuse presented initially to [facility] for SOB and intermittent chest pressure-was asked to go to the ER. Upon arrival he was found tachycardic, tachypneic, HR 115s and found to have a elevated trops (mild) and CXR with congestion and bilateral pleural effusion. ECG with LVH w repol.     01/29/2024 No CP overnight. SOB is improving. Diuresing well with IV Lasix. TTE pending.         Review of Systems   Constitutional: Negative.   HENT: Negative.     Eyes: Negative.    Cardiovascular:  Positive for dyspnea on exertion. Negative for chest pain, irregular heartbeat, near-syncope, orthopnea, palpitations, paroxysmal nocturnal dyspnea and syncope.   Respiratory:  Positive for shortness of breath (improving).    Gastrointestinal:  Negative for nausea and vomiting.   Genitourinary: Negative.    Neurological: Negative.    Psychiatric/Behavioral:  Positive for substance abuse.      Objective:     Vital Signs (Most Recent):  Temp: 96 °F (35.6 °C) (01/29/24 1140)  Pulse: 104 (01/29/24 1140)  Resp: 18 (01/29/24 1140)  BP: 119/87 (01/29/24 1140)  SpO2: 95 % (01/29/24 1152) Vital Signs (24h Range):  Temp:  [96 °F (35.6 °C)-98.6 °F (37 °C)] 96 °F (35.6 °C)  Pulse:  [] 104  Resp:  [18-20] 18  SpO2:  [92 %-99 %] 95 %  BP: ()/(58-87) 119/87     Weight: 81.2 kg (179 lb 0.2 oz)  Body mass index is 22.98 kg/m².     SpO2: 95 %         Intake/Output Summary (Last 24 hours) at 1/29/2024 1202  Last data filed at 1/29/2024 0305  Gross per 24 hour   Intake 435 ml   Output 1900 ml    Net -1465 ml       Lines/Drains/Airways       Peripheral Intravenous Line  Duration                  Peripheral IV - Single Lumen 01/26/24 0430 20 G Anterior;Left Forearm 3 days                       Physical Exam  Constitutional:       General: He is not in acute distress.     Appearance: He is not diaphoretic.   HENT:      Head: Atraumatic.   Eyes:      General:         Right eye: No discharge.         Left eye: No discharge.   Cardiovascular:      Rate and Rhythm: Normal rate and regular rhythm.   Pulmonary:      Effort: Pulmonary effort is normal.      Breath sounds: Wheezing present.   Abdominal:      General: Bowel sounds are normal.      Palpations: Abdomen is soft.   Neurological:      Mental Status: He is alert.            Significant Labs: BMP:   Recent Labs   Lab 01/28/24  0311 01/29/24  0319    128*    136   K 4.9 4.3    99   CO2 25 25   BUN 18 21*   CREATININE 1.1 1.3   CALCIUM 9.6 9.3   MG 1.7 1.8       Significant Imaging: Echocardiogram: Transthoracic echo (TTE) complete (Cupid Only):   Results for orders placed or performed during the hospital encounter of 01/25/24   Echo   Result Value Ref Range    BSA 2.1 m2    LVOT stroke volume 29.79 cm3    LVIDd 6.43 (A) 3.5 - 6.0 cm    LV Systolic Volume 158.93 mL    LV Systolic Volume Index 75.3 mL/m2    LVIDs 5.68 (A) 2.1 - 4.0 cm    LV Diastolic Volume 210.79 mL    LV Diastolic Volume Index 99.90 mL/m2    IVS 1.43 (A) 0.6 - 1.1 cm    LVOT diameter 2.22 cm    LVOT area 3.9 cm2    FS 12 (A) 28 - 44 %    Left Ventricle Relative Wall Thickness 0.44 cm    Posterior Wall 1.42 (A) 0.6 - 1.1 cm    LV mass 444.33 g    LV Mass Index 211 g/m2    MV Peak E Leon 1.04 m/s    TDI LATERAL 0.08 m/s    TDI SEPTAL 0.05 m/s    E/E' ratio 16.00 m/s    MV Peak A Leon 1.04 m/s    TR Max Leon 1.43 m/s    E/A ratio 1.00     E wave deceleration time 177.61 msec    LV SEPTAL E/E' RATIO 20.80 m/s    LV LATERAL E/E' RATIO 13.00 m/s    LVOT peak leon 0.58 m/s    Left  Ventricular Outflow Tract Mean Velocity 0.41 cm/s    Left Ventricular Outflow Tract Mean Gradient 0.77 mmHg    RVDD 3.71 cm    Right ventricular length in diastole (apical 4-chamber view) 7.78 cm    RV mid diameter 2.56 cm    LA size 5.25 cm    Left Atrium Minor Axis 3.72 cm    Left Atrium Major Axis 5.13 cm    LA volume (mod) 36.92 cm3    LA Volume Index (Mod) 17.5 mL/m2    RA Major Axis 4.52 cm    RA Width 3.83 cm    AV regurgitation pressure 1/2 time 119.377855999407858 ms    AR Max Leon 1.22 m/s    AV mean gradient 2 mmHg    AV peak gradient 3 mmHg    Ao peak leon 0.80 m/s    Ao VTI 12.30 cm    LVOT peak VTI 7.70 cm    AV valve area 2.42 cm²    AV Velocity Ratio 0.73     AV index (prosthetic) 0.63     REY by Velocity Ratio 2.80 cm²    Mr max leon 5.03 m/s    MV mean gradient 3 mmHg    MV peak gradient 8 mmHg    MV stenosis pressure 1/2 time 51.51 ms    MV valve area p 1/2 method 4.27 cm2    MV valve area by continuity eq 1.08 cm2    MV VTI 27.6 cm    Tricuspid valve peak A wave velocity 0.89078173589075 m/s    TV peak E leon 0.7 m/s    Triscuspid Valve Regurgitation Peak Gradient 8 mmHg    PV PEAK VELOCITY 0.57 m/s    PV peak gradient 1 mmHg    Pulmonary Valve Mean Velocity 0.45 m/s    Ao root annulus 3.77 cm    STJ 3.21 cm    Ascending aorta 2.99 cm    IVC diameter 2.14 cm    Mean e' 0.07 m/s    ZLVIDS 2.70     ZLVIDD -0.28     AORTIC VALVE CUSP SEPERATION 2.30 cm    EF 20 %    GLS -4.0 %    TAPSE 1.40 cm    TV resting pulmonary artery pressure 11 mmHg    RV TB RVSP 4 mmHg    Est. RA pres 3 mmHg    Narrative      Left Ventricle: The left ventricle is moderately dilated. Moderately   increased wall thickness. Severe global hypokinesis present. There is   severely reduced systolic function with a visually estimated ejection   fraction of 15 - 20%. Ejection fraction by visual approximation is 20%.   Global longitudinal strain is -4%. Severly reduced. There is diastolic   dysfunction.    Right Ventricle: Normal right  ventricular cavity size. Systolic   function is severely reduced. TAPSE is 1.40 cm.    Left Atrium: Left atrium is moderately dilated.    Right Atrium: Right atrium is mildly dilated.    Aortic Valve: The aortic valve is a trileaflet valve.    Mitral Valve: There is moderate to severe regurgitation.    IVC/SVC: Normal venous pressure at 3 mmHg.    Pericardium: There is a small effusion. No indication of cardiac   tamponade.       Assessment and Plan:     Brief HPI: Patient seen this morning on rounds with reports of improved breathing. No chest pain.     * Acute decompensated heart failure  TTE    Left Ventricle: The left ventricle is moderately dilated. Moderately   increased wall thickness. Severe global hypokinesis present. There is   severely reduced systolic function with a visually estimated ejection   fraction of 15 - 20%. Ejection fraction by visual approximation is 20%.   Global longitudinal strain is -4%. Severly reduced. There is diastolic   dysfunction.    Right Ventricle: Normal right ventricular cavity size. Systolic   function is severely reduced. TAPSE is 1.40 cm.    Left Atrium: Left atrium is moderately dilated.    Right Atrium: Right atrium is mildly dilated.    Aortic Valve: The aortic valve is a trileaflet valve.    Mitral Valve: There is moderate to severe regurgitation.    IVC/SVC: Normal venous pressure at 3 mmHg.    Pericardium: There is a small effusion. No indication of cardiac   tamponade.    Troponin flat 0.1-0.2, NonACS trend- likely demand in setting of Methamphetamine use   EKG without acute ischemic changes  On GDMT with BB and ACEI  Adequate output with IV Lasix  Recommend ambulation around nursing station and if feeling back to baseline, transition to Lasix 40 mg p.o. daily  Will need close follow up with cardiology   PET stress to be planned as OP      Methamphetamine use  Cessation advised    Tobacco abuse  Cessation advised     Hypertension  SBP 100s-110s  Continue low dose BB  and ACEI        VTE Risk Mitigation (From admission, onward)           Ordered     Reason for No Pharmacological VTE Prophylaxis  Once        Question:  Reasons:  Answer:  Physician Provided (leave comment)  Comment:  already in anticoag    01/27/24 1447     IP VTE HIGH RISK PATIENT  Once         01/27/24 1447     Place sequential compression device  Until discontinued         01/27/24 1447                    Anderson Huff NP  Cardiology  [facility] - Telemetry

## 2024-01-29 NOTE — PLAN OF CARE
went to meet with patient. Patient is a transfer from [facility]. He tell me is independent and lives with his parents at home. He does not use any DME or have Home Health. Patient reports he has [employer] Insurance. I will check with . Patient does not have a PCP. Patient is not interested in scheduling appointment. I will follow-up again with patient and provide information. He still drives, but he does have family to transport home at discharge. Patient encouraged to call with any questions or concerns.  will continue to follow patient through transitions of care and assist with any discharge needs.     Cardiology recommended OP follow-up.  requested appointment from access navigator.     Patient Contacts    Name Relation Home Work Mobile   Camryn Manuel Mother [phone]   Roberto Manuel Father   [phone]        01/29/24 1128   Discharge Assessment   Assessment Type Discharge Planning Assessment   Confirmed/corrected address, phone number and insurance Yes   Confirmed Demographics Correct on Facesheet   Source of Information patient   People in Home parent(s)   Facility Arrived From: [facility]   Do you expect to return to your current living situation? No   Do you have help at home or someone to help you manage your care at home? No   Prior to hospitilization cognitive status: Alert/Oriented   Current cognitive status: Alert/Oriented   Walking or Climbing Stairs Difficulty no   Dressing/Bathing Difficulty no   Equipment Currently Used at Home none   Do you take prescription medications? No   Do you have prescription coverage? Yes   Coverage [employer]   Do you have any problems affording any of your prescribed medications? TBD   Who is going to help you get home at discharge? Family   How do you get to doctors appointments? car, drives self   Are you on dialysis? No   Do you take coumadin? No   Discharge Plan A Home   Discharge  Plan B Home with family   DME Needed Upon Discharge  none   Discharge Plan discussed with: Patient   Transition of Care Barriers Substance Abuse   Physical Activity   On average, how many days per week do you engage in moderate to strenuous exercise (like a brisk walk)? Pt Declined   On average, how many minutes do you engage in exercise at this level? Pt Declined   Financial Resource Strain   How hard is it for you to pay for the very basics like food, housing, medical care, and heating? Pt Declined   Housing Stability   In the last 12 months, was there a time when you were not able to pay the mortgage or rent on time? Pt Declined   In the last 12 months, was there a time when you did not have a steady place to sleep or slept in a shelter (including now)? Pt Declined   Transportation Needs   In the past 12 months, has lack of transportation kept you from medical appointments or from getting medications? no   In the past 12 months, has lack of transportation kept you from meetings, work, or from getting things needed for daily living? No   Food Insecurity   Within the past 12 months, you worried that your food would run out before you got the money to buy more. Pt Declined   Within the past 12 months, the food you bought just didn't last and you didn't have money to get more. Pt Declined   Stress   Do you feel stress - tense, restless, nervous, or anxious, or unable to sleep at night because your mind is troubled all the time - these days? Pt Declined   Social Connections   In a typical week, how many times do you talk on the phone with family, friends, or neighbors? More than 3   How often do you get together with friends or relatives? More than 3   How often do you attend Christianity or Religion services? Pt Declined   Do you belong to any clubs or organizations such as Christianity groups, unions, fraternal or athletic groups, or school groups? Pt Declined   How often do you attend meetings of the clubs or organizations  you belong to? Pt Declined   Are you , , , , never , or living with a partner? Pt Declined   Alcohol Use   Q1: How often do you have a drink containing alcohol? Pt Declined   Q2: How many drinks containing alcohol do you have on a typical day when you are drinking? Pt Declined   Q3: How often do you have six or more drinks on one occasion? Pt Declined     Jasmyn Gillis RN    [phone]

## 2024-01-29 NOTE — SUBJECTIVE & OBJECTIVE
Interval History:  Patient was examined in his bed.  Still has BEAUCHAMP    Review of Systems   Constitutional:  Positive for fatigue. Negative for appetite change, chills, diaphoresis and fever.   HENT:  Negative for congestion and facial swelling.    Eyes:  Negative for discharge.   Respiratory:  Positive for shortness of breath. Negative for apnea and chest tightness.    Cardiovascular:  Negative for chest pain, palpitations and leg swelling.   Gastrointestinal:  Negative for abdominal pain, constipation, diarrhea and nausea.   Genitourinary:  Negative for difficulty urinating.   Musculoskeletal:  Negative for back pain.   Neurological:  Positive for weakness. Negative for dizziness and light-headedness.   Psychiatric/Behavioral:  Negative for agitation.      Objective:     Vital Signs (Most Recent):  Temp: 97.9 °F (36.6 °C) (01/29/24 1533)  Pulse: 101 (01/29/24 1533)  Resp: 18 (01/29/24 1533)  BP: 113/71 (01/29/24 1533)  SpO2: (!) 93 % (01/29/24 1533) Vital Signs (24h Range):  Temp:  [96 °F (35.6 °C)-98.2 °F (36.8 °C)] 97.9 °F (36.6 °C)  Pulse:  [] 101  Resp:  [18-20] 18  SpO2:  [92 %-99 %] 93 %  BP: (107-128)/(70-87) 113/71     Weight: 81.2 kg (179 lb 0.2 oz)  Body mass index is 22.98 kg/m².    Intake/Output Summary (Last 24 hours) at 1/29/2024 1639  Last data filed at 1/29/2024 1200  Gross per 24 hour   Intake 810 ml   Output 1900 ml   Net -1090 ml           Physical Exam  HENT:      Head: Normocephalic and atraumatic.      Nose: No congestion or rhinorrhea.      Mouth/Throat:      Pharynx: Oropharynx is clear.   Eyes:      Conjunctiva/sclera: Conjunctivae normal.   Cardiovascular:      Rate and Rhythm: Tachycardia present.      Pulses: Normal pulses.   Pulmonary:      Effort: Pulmonary effort is normal. No respiratory distress.      Comments: Course bs  Abdominal:      General: Bowel sounds are normal.      Palpations: Abdomen is soft.      Tenderness: There is no abdominal tenderness.   Musculoskeletal:          General: No swelling. Normal range of motion.      Cervical back: Normal range of motion and neck supple. No rigidity.      Right lower leg: No edema.      Left lower leg: No edema.   Skin:     General: Skin is warm.      Coloration: Skin is not jaundiced.   Neurological:      Mental Status: He is alert and oriented to person, place, and time. Mental status is at baseline.      Motor: Weakness present.   Psychiatric:         Mood and Affect: Mood normal.             Significant Labs: All pertinent labs within the past 24 hours have been reviewed.    Significant Imaging: I have reviewed all pertinent imaging results/findings within the past 24 hours.

## 2024-01-29 NOTE — SUBJECTIVE & OBJECTIVE
Review of Systems   Constitutional: Negative.   HENT: Negative.     Eyes: Negative.    Cardiovascular:  Positive for dyspnea on exertion. Negative for chest pain, irregular heartbeat, near-syncope, orthopnea, palpitations, paroxysmal nocturnal dyspnea and syncope.   Respiratory:  Positive for shortness of breath (improving).    Gastrointestinal:  Negative for nausea and vomiting.   Genitourinary: Negative.    Neurological: Negative.    Psychiatric/Behavioral:  Positive for substance abuse.      Objective:     Vital Signs (Most Recent):  Temp: 96 °F (35.6 °C) (01/29/24 1140)  Pulse: 104 (01/29/24 1140)  Resp: 18 (01/29/24 1140)  BP: 119/87 (01/29/24 1140)  SpO2: 95 % (01/29/24 1152) Vital Signs (24h Range):  Temp:  [96 °F (35.6 °C)-98.6 °F (37 °C)] 96 °F (35.6 °C)  Pulse:  [] 104  Resp:  [18-20] 18  SpO2:  [92 %-99 %] 95 %  BP: ()/(58-87) 119/87     Weight: 81.2 kg (179 lb 0.2 oz)  Body mass index is 22.98 kg/m².     SpO2: 95 %         Intake/Output Summary (Last 24 hours) at 1/29/2024 1202  Last data filed at 1/29/2024 0305  Gross per 24 hour   Intake 435 ml   Output 1900 ml   Net -1465 ml       Lines/Drains/Airways       Peripheral Intravenous Line  Duration                  Peripheral IV - Single Lumen 01/26/24 0430 20 G Anterior;Left Forearm 3 days                       Physical Exam  Constitutional:       General: He is not in acute distress.     Appearance: He is not diaphoretic.   HENT:      Head: Atraumatic.   Eyes:      General:         Right eye: No discharge.         Left eye: No discharge.   Cardiovascular:      Rate and Rhythm: Normal rate and regular rhythm.   Pulmonary:      Effort: Pulmonary effort is normal.      Breath sounds: Wheezing present.   Abdominal:      General: Bowel sounds are normal.      Palpations: Abdomen is soft.   Neurological:      Mental Status: He is alert.            Significant Labs: BMP:   Recent Labs   Lab 01/28/24  0311 01/29/24  0319    128*     136   K 4.9 4.3    99   CO2 25 25   BUN 18 21*   CREATININE 1.1 1.3   CALCIUM 9.6 9.3   MG 1.7 1.8       Significant Imaging: Echocardiogram: Transthoracic echo (TTE) complete (Cupid Only):   Results for orders placed or performed during the hospital encounter of 01/25/24   Echo   Result Value Ref Range    BSA 2.1 m2    LVOT stroke volume 29.79 cm3    LVIDd 6.43 (A) 3.5 - 6.0 cm    LV Systolic Volume 158.93 mL    LV Systolic Volume Index 75.3 mL/m2    LVIDs 5.68 (A) 2.1 - 4.0 cm    LV Diastolic Volume 210.79 mL    LV Diastolic Volume Index 99.90 mL/m2    IVS 1.43 (A) 0.6 - 1.1 cm    LVOT diameter 2.22 cm    LVOT area 3.9 cm2    FS 12 (A) 28 - 44 %    Left Ventricle Relative Wall Thickness 0.44 cm    Posterior Wall 1.42 (A) 0.6 - 1.1 cm    LV mass 444.33 g    LV Mass Index 211 g/m2    MV Peak E Leon 1.04 m/s    TDI LATERAL 0.08 m/s    TDI SEPTAL 0.05 m/s    E/E' ratio 16.00 m/s    MV Peak A Leon 1.04 m/s    TR Max Leon 1.43 m/s    E/A ratio 1.00     E wave deceleration time 177.61 msec    LV SEPTAL E/E' RATIO 20.80 m/s    LV LATERAL E/E' RATIO 13.00 m/s    LVOT peak leon 0.58 m/s    Left Ventricular Outflow Tract Mean Velocity 0.41 cm/s    Left Ventricular Outflow Tract Mean Gradient 0.77 mmHg    RVDD 3.71 cm    Right ventricular length in diastole (apical 4-chamber view) 7.78 cm    RV mid diameter 2.56 cm    LA size 5.25 cm    Left Atrium Minor Axis 3.72 cm    Left Atrium Major Axis 5.13 cm    LA volume (mod) 36.92 cm3    LA Volume Index (Mod) 17.5 mL/m2    RA Major Axis 4.52 cm    RA Width 3.83 cm    AV regurgitation pressure 1/2 time 119.781480446940023 ms    AR Max Leon 1.22 m/s    AV mean gradient 2 mmHg    AV peak gradient 3 mmHg    Ao peak leon 0.80 m/s    Ao VTI 12.30 cm    LVOT peak VTI 7.70 cm    AV valve area 2.42 cm²    AV Velocity Ratio 0.73     AV index (prosthetic) 0.63     REY by Velocity Ratio 2.80 cm²    Mr max leon 5.03 m/s    MV mean gradient 3 mmHg    MV peak gradient 8 mmHg    MV stenosis  pressure 1/2 time 51.51 ms    MV valve area p 1/2 method 4.27 cm2    MV valve area by continuity eq 1.08 cm2    MV VTI 27.6 cm    Tricuspid valve peak A wave velocity 0.97471287524233 m/s    TV peak E tennille 0.7 m/s    Triscuspid Valve Regurgitation Peak Gradient 8 mmHg    PV PEAK VELOCITY 0.57 m/s    PV peak gradient 1 mmHg    Pulmonary Valve Mean Velocity 0.45 m/s    Ao root annulus 3.77 cm    STJ 3.21 cm    Ascending aorta 2.99 cm    IVC diameter 2.14 cm    Mean e' 0.07 m/s    ZLVIDS 2.70     ZLVIDD -0.28     AORTIC VALVE CUSP SEPERATION 2.30 cm    EF 20 %    GLS -4.0 %    TAPSE 1.40 cm    TV resting pulmonary artery pressure 11 mmHg    RV TB RVSP 4 mmHg    Est. RA pres 3 mmHg    Narrative      Left Ventricle: The left ventricle is moderately dilated. Moderately   increased wall thickness. Severe global hypokinesis present. There is   severely reduced systolic function with a visually estimated ejection   fraction of 15 - 20%. Ejection fraction by visual approximation is 20%.   Global longitudinal strain is -4%. Severly reduced. There is diastolic   dysfunction.    Right Ventricle: Normal right ventricular cavity size. Systolic   function is severely reduced. TAPSE is 1.40 cm.    Left Atrium: Left atrium is moderately dilated.    Right Atrium: Right atrium is mildly dilated.    Aortic Valve: The aortic valve is a trileaflet valve.    Mitral Valve: There is moderate to severe regurgitation.    IVC/SVC: Normal venous pressure at 3 mmHg.    Pericardium: There is a small effusion. No indication of cardiac   tamponade.

## 2024-01-29 NOTE — PROGRESS NOTES
[facility] Medicine  Progress Note    Patient Name: Yeison Manuel  MRN: [id number]  Patient Class: IP- Inpatient   Admission Date: 1/27/2024  Length of Stay: 2 days  Attending Physician: Arabella Jama MD  Primary Care Provider: Leora, Primary Doctor        Subjective:     Principal Problem:Acute decompensated heart failure        HPI:   53-year-old male with a past medical history of drug abuse; was transferred to [facility] from [facility] after going to the hospital for shortness of breath for the past week.  The patient also complains of intermittent chest pain.  He decided to go into the clinic and was asked to come to the emergency department.  The patient was found to be tachycardic and had oxygen saturation of 96% on room air.  He had a respiration rate of 19 breaths per minute.  One hundred emergency department his heart rate increased to 115 breaths per minute with RR of 24.  He was also found to have elevated BNP and troponin.  Pulmonary embolism was ruled out.  He was admitted to ICU.  According to notes chest x-ray and CT are consistent with congestive heart failure and small bilateral pleural effusions. Initial EKG which was done at 1:40 p.m. shows diffuse nonspecific T-wave changes most obvious in V4 V5 and V6.  EKG repeated at 6:53 p.m. is the same without changes.  There are inverted T-waves in V4 V5 and V6.  Echocardiogram was done and showed an ejection fraction of 15-20%. Patient was transferred for cardiac work-up.    Overview/Hospital Course:  Pt admitted. Started on diuresis. Cards on board.Troponin flat 0.1-0.2, NonACS trend- likely demand in setting of Methamphetamine use   Echo: Severe global hypokinesis present. There is severely reduced systolic function with a visually estimated ejection fraction of 15 - 20%. Ejection fraction by visual approximation is 20%. Global longitudinal strain is -4%. Severly reduced. There is diastolic dysfunction.     Interval  History:  Patient was examined in his bed.  Still has BEAUCHAMP    Review of Systems   Constitutional:  Positive for fatigue. Negative for appetite change, chills, diaphoresis and fever.   HENT:  Negative for congestion and facial swelling.    Eyes:  Negative for discharge.   Respiratory:  Positive for shortness of breath. Negative for apnea and chest tightness.    Cardiovascular:  Negative for chest pain, palpitations and leg swelling.   Gastrointestinal:  Negative for abdominal pain, constipation, diarrhea and nausea.   Genitourinary:  Negative for difficulty urinating.   Musculoskeletal:  Negative for back pain.   Neurological:  Positive for weakness. Negative for dizziness and light-headedness.   Psychiatric/Behavioral:  Negative for agitation.      Objective:     Vital Signs (Most Recent):  Temp: 97.9 °F (36.6 °C) (01/29/24 1533)  Pulse: 101 (01/29/24 1533)  Resp: 18 (01/29/24 1533)  BP: 113/71 (01/29/24 1533)  SpO2: (!) 93 % (01/29/24 1533) Vital Signs (24h Range):  Temp:  [96 °F (35.6 °C)-98.2 °F (36.8 °C)] 97.9 °F (36.6 °C)  Pulse:  [] 101  Resp:  [18-20] 18  SpO2:  [92 %-99 %] 93 %  BP: (107-128)/(70-87) 113/71     Weight: 81.2 kg (179 lb 0.2 oz)  Body mass index is 22.98 kg/m².    Intake/Output Summary (Last 24 hours) at 1/29/2024 1639  Last data filed at 1/29/2024 1200  Gross per 24 hour   Intake 810 ml   Output 1900 ml   Net -1090 ml           Physical Exam  HENT:      Head: Normocephalic and atraumatic.      Nose: No congestion or rhinorrhea.      Mouth/Throat:      Pharynx: Oropharynx is clear.   Eyes:      Conjunctiva/sclera: Conjunctivae normal.   Cardiovascular:      Rate and Rhythm: Tachycardia present.      Pulses: Normal pulses.   Pulmonary:      Effort: Pulmonary effort is normal. No respiratory distress.      Comments: Course bs  Abdominal:      General: Bowel sounds are normal.      Palpations: Abdomen is soft.      Tenderness: There is no abdominal tenderness.   Musculoskeletal:          General: No swelling. Normal range of motion.      Cervical back: Normal range of motion and neck supple. No rigidity.      Right lower leg: No edema.      Left lower leg: No edema.   Skin:     General: Skin is warm.      Coloration: Skin is not jaundiced.   Neurological:      Mental Status: He is alert and oriented to person, place, and time. Mental status is at baseline.      Motor: Weakness present.   Psychiatric:         Mood and Affect: Mood normal.             Significant Labs: All pertinent labs within the past 24 hours have been reviewed.    Significant Imaging: I have reviewed all pertinent imaging results/findings within the past 24 hours.    Assessment/Plan:      * Acute decompensated heart failure  -elevated troponin, continue with Lovenox  -continue with aspirin, carvedilol, lisinopril  -continue supplemental oxygen, wean as tolerated  -continuing with IV Lasix  -continue with strict urine output, low-sodium diet, daily weights  -cardiology following    TTE    Left Ventricle: The left ventricle is moderately dilated. Moderately   increased wall thickness. Severe global hypokinesis present. There is   severely reduced systolic function with a visually estimated ejection   fraction of 15 - 20%. Ejection fraction by visual approximation is 20%.   Global longitudinal strain is -4%. Severly reduced. There is diastolic   dysfunction.    Right Ventricle: Normal right ventricular cavity size. Systolic   function is severely reduced. TAPSE is 1.40 cm.    Left Atrium: Left atrium is moderately dilated.    Right Atrium: Right atrium is mildly dilated.    Aortic Valve: The aortic valve is a trileaflet valve.    Mitral Valve: There is moderate to severe regurgitation.    IVC/SVC: Normal venous pressure at 3 mmHg.    Pericardium: There is a small effusion. No indication of cardiac   tamponade.     Troponin flat 0.1-0.2, NonACS trend- likely demand in setting of Methamphetamine use   EKG without acute ischemic  changes  On GDMT with BB and ACEI  Adequate output with IV Lasix  Recommend ambulation around nursing station and if feeling back to baseline, transition to Lasix 40 mg p.o. daily  Will need close follow up with cardiology   PET stress to be planned as OP    Dyspnea on exertion    -continue with supplemental oxygen  -continue with bronchodilators    COPD (chronic obstructive pulmonary disease)  Patient's COPD is controlled currently.  Patient is currently off COPD Pathway. Continue scheduled inhalers  not in excerebration and Supplemental oxygen and monitor respiratory status closely.     Tobacco abuse  -counseled  -refused nicotine patch      Hypertension  Chronic, controlled. Latest blood pressure and vitals reviewed-     Temp:  [97.2 °F (36.2 °C)-98 °F (36.7 °C)]   Pulse:  [102-114]   Resp:  [18-22]   BP: (121-147)/(85-95)   SpO2:  [94 %-100 %] .   Home meds for hypertension were reviewed and noted below.     While in the hospital, will manage blood pressure as follows; Continue home antihypertensive regimen    Will utilize p.r.n. blood pressure medication only if patient's blood pressure greater than 160/100 and he develops symptoms such as worsening chest pain or shortness of breath.  -continue with Coreg, Lisinopril and Lasix  -low-sodium diet  -monitor vitals      VTE Risk Mitigation (From admission, onward)           Ordered     Reason for No Pharmacological VTE Prophylaxis  Once        Question:  Reasons:  Answer:  Physician Provided (leave comment)  Comment:  already in anticoag    01/27/24 1447     IP VTE HIGH RISK PATIENT  Once         01/27/24 1447     Place sequential compression device  Until discontinued         01/27/24 1447                    Discharge Planning   MARITO:      Code Status: Full Code   Is the patient medically ready for discharge?:     Reason for patient still in hospital (select all that apply): Treatment  Discharge Plan A: Home                  Arabella Jama MD  Department of  [facility]

## 2024-01-29 NOTE — ASSESSMENT & PLAN NOTE
-elevated troponin, continue with Lovenox  -continue with aspirin, carvedilol, lisinopril  -continue supplemental oxygen, wean as tolerated  -continuing with IV Lasix  -continue with strict urine output, low-sodium diet, daily weights  -cardiology following    TTE    Left Ventricle: The left ventricle is moderately dilated. Moderately   increased wall thickness. Severe global hypokinesis present. There is   severely reduced systolic function with a visually estimated ejection   fraction of 15 - 20%. Ejection fraction by visual approximation is 20%.   Global longitudinal strain is -4%. Severly reduced. There is diastolic   dysfunction.    Right Ventricle: Normal right ventricular cavity size. Systolic   function is severely reduced. TAPSE is 1.40 cm.    Left Atrium: Left atrium is moderately dilated.    Right Atrium: Right atrium is mildly dilated.    Aortic Valve: The aortic valve is a trileaflet valve.    Mitral Valve: There is moderate to severe regurgitation.    IVC/SVC: Normal venous pressure at 3 mmHg.    Pericardium: There is a small effusion. No indication of cardiac   tamponade.     Troponin flat 0.1-0.2, NonACS trend- likely demand in setting of Methamphetamine use   EKG without acute ischemic changes  On GDMT with BB and ACEI  Adequate output with IV Lasix  Recommend ambulation around nursing station and if feeling back to baseline, transition to Lasix 40 mg p.o. daily  Will need close follow up with cardiology   PET stress to be planned as OP

## 2024-01-30 VITALS
OXYGEN SATURATION: 95 % | TEMPERATURE: 98 F | DIASTOLIC BLOOD PRESSURE: 81 MMHG | BODY MASS INDEX: 22.97 KG/M2 | SYSTOLIC BLOOD PRESSURE: 117 MMHG | HEIGHT: 74 IN | HEART RATE: 102 BPM | WEIGHT: 179 LBS | RESPIRATION RATE: 18 BRPM

## 2024-01-30 LAB
ANION GAP SERPL CALC-SCNC: 13 MMOL/L (ref 8–16)
BUN SERPL-MCNC: 23 MG/DL (ref 6–20)
CALCIUM SERPL-MCNC: 9.8 MG/DL (ref 8.7–10.5)
CHLORIDE SERPL-SCNC: 101 MMOL/L (ref 95–110)
CO2 SERPL-SCNC: 22 MMOL/L (ref 23–29)
CREAT SERPL-MCNC: 1 MG/DL (ref 0.5–1.4)
ERYTHROCYTE [DISTWIDTH] IN BLOOD BY AUTOMATED COUNT: 14.4 % (ref 11.5–14.5)
EST. GFR  (NO RACE VARIABLE): >60 ML/MIN/1.73 M^2
GLUCOSE SERPL-MCNC: 92 MG/DL (ref 70–110)
HCT VFR BLD AUTO: 56.7 % (ref 40–54)
HGB BLD-MCNC: 19.8 G/DL (ref 14–18)
MAGNESIUM SERPL-MCNC: 2 MG/DL (ref 1.6–2.6)
MCH RBC QN AUTO: 30 PG (ref 27–31)
MCHC RBC AUTO-ENTMCNC: 34.9 G/DL (ref 32–36)
MCV RBC AUTO: 86 FL (ref 82–98)
PLATELET # BLD AUTO: 374 K/UL (ref 150–450)
PMV BLD AUTO: 9.7 FL (ref 9.2–12.9)
POTASSIUM SERPL-SCNC: 4 MMOL/L (ref 3.5–5.1)
RBC # BLD AUTO: 6.6 M/UL (ref 4.6–6.2)
SODIUM SERPL-SCNC: 136 MMOL/L (ref 136–145)
WBC # BLD AUTO: 9.76 K/UL (ref 3.9–12.7)

## 2024-01-30 PROCEDURE — 94761 N-INVAS EAR/PLS OXIMETRY MLT: CPT

## 2024-01-30 PROCEDURE — 99900035 HC TECH TIME PER 15 MIN (STAT)

## 2024-01-30 PROCEDURE — 83735 ASSAY OF MAGNESIUM: CPT | Performed by: INTERNAL MEDICINE

## 2024-01-30 PROCEDURE — 36415 COLL VENOUS BLD VENIPUNCTURE: CPT | Performed by: INTERNAL MEDICINE

## 2024-01-30 PROCEDURE — 63600175 PHARM REV CODE 636 W HCPCS: Performed by: INTERNAL MEDICINE

## 2024-01-30 PROCEDURE — 25000003 PHARM REV CODE 250: Performed by: INTERNAL MEDICINE

## 2024-01-30 PROCEDURE — 85027 COMPLETE CBC AUTOMATED: CPT | Performed by: INTERNAL MEDICINE

## 2024-01-30 PROCEDURE — 80048 BASIC METABOLIC PNL TOTAL CA: CPT | Performed by: INTERNAL MEDICINE

## 2024-01-30 RX ORDER — LISINOPRIL 2.5 MG/1
2.5 TABLET ORAL DAILY
Qty: 90 TABLET | Refills: 3 | Status: ON HOLD | OUTPATIENT
Start: 2024-01-31 | End: 2024-05-29

## 2024-01-30 RX ORDER — CARVEDILOL 3.12 MG/1
3.12 TABLET ORAL 2 TIMES DAILY
Qty: 60 TABLET | Refills: 11 | Status: ON HOLD | OUTPATIENT
Start: 2024-01-30 | End: 2024-05-29

## 2024-01-30 RX ORDER — NAPROXEN SODIUM 220 MG/1
81 TABLET, FILM COATED ORAL DAILY
Qty: 30 TABLET | Refills: 3 | Status: SHIPPED | OUTPATIENT
Start: 2024-01-31 | End: 2025-01-30

## 2024-01-30 RX ORDER — FUROSEMIDE 40 MG/1
40 TABLET ORAL DAILY
Qty: 30 TABLET | Refills: 3 | Status: ON HOLD | OUTPATIENT
Start: 2024-01-31 | End: 2024-05-29

## 2024-01-30 RX ORDER — FUROSEMIDE 40 MG/1
40 TABLET ORAL DAILY
Status: DISCONTINUED | OUTPATIENT
Start: 2024-01-31 | End: 2024-01-30 | Stop reason: HOSPADM

## 2024-01-30 RX ADMIN — ASPIRIN 81 MG CHEWABLE TABLET 81 MG: 81 TABLET CHEWABLE at 09:01

## 2024-01-30 RX ADMIN — TAMSULOSIN HYDROCHLORIDE 0.4 MG: 0.4 CAPSULE ORAL at 09:01

## 2024-01-30 RX ADMIN — CARVEDILOL 3.12 MG: 3.12 TABLET, FILM COATED ORAL at 09:01

## 2024-01-30 RX ADMIN — FUROSEMIDE 40 MG: 10 INJECTION, SOLUTION INTRAVENOUS at 09:01

## 2024-01-30 RX ADMIN — LISINOPRIL 2.5 MG: 2.5 TABLET ORAL at 09:01

## 2024-01-30 NOTE — PLAN OF CARE
Cardiology follow-up scheduled.    -- met with patient and family at bedside. Patient is agreeable for  to schedule PCP follow-up. Will contact office. He is aware Cardiology follow-up scheduled in [place] (established). All questions answered. Family will transport patient home.     Patient Contacts    Name Relation Home Work Mobile   Camryn Manuel Mother [phone]   Roberto Manuel Father   [phone]     Dr. Zhang Dueñas     [facility] (Patient is estab with the [facility]) [id number], [facility] PH: [phone] ([employer] [employer] [place])      Next Steps: Follow up on 2/7/2024  Instructions: at 10:15 am, Cardiology Follow-Up     Dr. Stuart     [id number], [facility] PH: [phone] FAX#[phone]      Next Steps: Follow up on 2/6/2024  Instructions: at 2:00 pm--Primary Care Physician        01/30/24 1225   Final Note   Assessment Type Final Discharge Note   Anticipated Discharge Disposition Home   Hospital Resources/Appts/Education Provided Appointments scheduled and added to AVS   Post-Acute Status   Discharge Delays None known at this time     Jasmyn Gillis RN    [phone]

## 2024-01-30 NOTE — NURSING
D/C instructions given, all appointments discussed. Given to pt. Mother and daughter @ bedside. Verbalized understanding

## 2024-01-30 NOTE — ASSESSMENT & PLAN NOTE
-elevated troponin, continue with Lovenox  -continue with aspirin, carvedilol, lisinopril  -continue supplemental oxygen, wean as tolerated  -continuing with IV Lasix  -continue with strict urine output, low-sodium diet, daily weights  -cardiology following    TTE    Left Ventricle: The left ventricle is moderately dilated. Moderately   increased wall thickness. Severe global hypokinesis present. There is   severely reduced systolic function with a visually estimated ejection   fraction of 15 - 20%. Ejection fraction by visual approximation is 20%.   Global longitudinal strain is -4%. Severly reduced. There is diastolic   dysfunction.    Right Ventricle: Normal right ventricular cavity size. Systolic   function is severely reduced. TAPSE is 1.40 cm.    Left Atrium: Left atrium is moderately dilated.    Right Atrium: Right atrium is mildly dilated.    Aortic Valve: The aortic valve is a trileaflet valve.    Mitral Valve: There is moderate to severe regurgitation.    IVC/SVC: Normal venous pressure at 3 mmHg.    Pericardium: There is a small effusion. No indication of cardiac   tamponade.     Troponin flat 0.1-0.2, NonACS trend- likely demand in setting of Methamphetamine use   EKG without acute ischemic changes  On GDMT with BB and ACEI  Adequate output with IV Lasix  Recommend ambulation around nursing station and if feeling back to baseline, transition to Lasix 40 mg p.o. daily  Will need close follow up with cardiology   PET stress to be planned as OP

## 2024-01-30 NOTE — DISCHARGE SUMMARY
[facility] Medicine  Discharge Summary      Patient Name: Yeison Manuel  MRN: [id number]  SUBHA: [id number]  Patient Class: IP- Inpatient  Admission Date: 1/27/2024  Hospital Length of Stay: 3 days  Discharge Date and Time:  01/30/2024 12:31 PM  Attending Physician: Arabella Jama MD   Discharging Provider: Arabella Jama MD  Primary Care Provider: Leora, Primary Doctor    Primary Care Team: Networked reference to record PCT     HPI:    53-year-old male with a past medical history of drug abuse; was transferred to [facility] from [facility] after going to the hospital for shortness of breath for the past week.  The patient also complains of intermittent chest pain.  He decided to go into the clinic and was asked to come to the emergency department.  The patient was found to be tachycardic and had oxygen saturation of 96% on room air.  He had a respiration rate of 19 breaths per minute.  One hundred emergency department his heart rate increased to 115 breaths per minute with RR of 24.  He was also found to have elevated BNP and troponin.  Pulmonary embolism was ruled out.  He was admitted to ICU.  According to notes chest x-ray and CT are consistent with congestive heart failure and small bilateral pleural effusions. Initial EKG which was done at 1:40 p.m. shows diffuse nonspecific T-wave changes most obvious in V4 V5 and V6.  EKG repeated at 6:53 p.m. is the same without changes.  There are inverted T-waves in V4 V5 and V6.  Echocardiogram was done and showed an ejection fraction of 15-20%. Patient was transferred for cardiac work-up.    * No surgery found *      Hospital Course:   Pt admitted. Started on diuresis. Cards on board.Troponin flat 0.1-0.2, NonACS trend- likely demand in setting of Methamphetamine use   Echo: Severe global hypokinesis present. There is severely reduced systolic function with a visually estimated ejection fraction of 15 - 20%. Ejection fraction by  visual approximation is 20%. Global longitudinal strain is -4%. Severly reduced. There is diastolic dysfunction.     Pt diruesed over 4L with SOB improved. Cards ok'd to transition to po. Deemed stable to be d/c.        Goals of Care Treatment Preferences:  Code Status: Full Code      Consults:   Consults (From admission, onward)          Status Ordering Provider     Cardiology-[facility]  Once        Provider:  (Not yet assigned)    BURT Urena            Pulmonary  COPD (chronic obstructive pulmonary disease)  Patient's COPD is controlled currently.  Patient is currently off COPD Pathway. Continue scheduled inhalers  not in excerebration and Supplemental oxygen and monitor respiratory status closely.     Cardiac/Vascular  * Acute decompensated heart failure  -elevated troponin, continue with Lovenox  -continue with aspirin, carvedilol, lisinopril  -continue supplemental oxygen, wean as tolerated  -continuing with IV Lasix  -continue with strict urine output, low-sodium diet, daily weights  -cardiology following    TTE    Left Ventricle: The left ventricle is moderately dilated. Moderately   increased wall thickness. Severe global hypokinesis present. There is   severely reduced systolic function with a visually estimated ejection   fraction of 15 - 20%. Ejection fraction by visual approximation is 20%.   Global longitudinal strain is -4%. Severly reduced. There is diastolic   dysfunction.    Right Ventricle: Normal right ventricular cavity size. Systolic   function is severely reduced. TAPSE is 1.40 cm.    Left Atrium: Left atrium is moderately dilated.    Right Atrium: Right atrium is mildly dilated.    Aortic Valve: The aortic valve is a trileaflet valve.    Mitral Valve: There is moderate to severe regurgitation.    IVC/SVC: Normal venous pressure at 3 mmHg.    Pericardium: There is a small effusion. No indication of cardiac   tamponade.     Troponin flat 0.1-0.2, NonACS trend- likely demand in  setting of Methamphetamine use   EKG without acute ischemic changes  On GDMT with BB and ACEI  Adequate output with IV Lasix  Recommend ambulation around nursing station and if feeling back to baseline, transition to Lasix 40 mg p.o. daily  Will need close follow up with cardiology   PET stress to be planned as OP    Dyspnea on exertion    -continue with supplemental oxygen  -continue with bronchodilators    Hypertension  Chronic, controlled. Latest blood pressure and vitals reviewed-     Temp:  [97.8 °F (36.6 °C)-98.6 °F (37 °C)]   Pulse:  []   Resp:  [18-20]   BP: (103-131)/(57-81)   SpO2:  [90 %-95 %] .   Home meds for hypertension were reviewed and noted below.     While in the hospital, will manage blood pressure as follows; Continue home antihypertensive regimen    Will utilize p.r.n. blood pressure medication only if patient's blood pressure greater than 160/100 and he develops symptoms such as worsening chest pain or shortness of breath.  -continue with Coreg, Lisinopril and Lasix  -low-sodium diet  -monitor vitals    Other  Methamphetamine use  counseled      Tobacco abuse  -counseled  -refused nicotine patch        Final Active Diagnoses:    Diagnosis Date Noted POA    PRINCIPAL PROBLEM:  Acute decompensated heart failure [I50.9] 01/26/2024 Yes    Dyspnea on exertion [R06.09] 01/27/2024 Yes    COPD (chronic obstructive pulmonary disease) [J44.9] 01/26/2024 Yes    Hypertension [I10] 01/26/2024 Yes    Tobacco abuse [Z72.0] 01/26/2024 Yes    Methamphetamine use [F15.10] 01/26/2024 Yes      Problems Resolved During this Admission:       Discharged Condition: stable    Disposition: Home or Self Care    Follow Up:   Follow-up Information       Dr. Zhang Dueñas Follow up on 2/7/2024.    Why: at 10:15 am, Cardiology Follow-Up  Contact information:  [facility] (Patient is estab with the [facility])  [id number] [place], [facility]   PH: [phone]  (Behind the  [employer])                         Patient Instructions:      ACCEPT - Ambulatory referral/consult to Cardiology   Standing Status: Future   Referral Priority: Routine Referral Type: Consultation   Referral Reason: Specialty Services Required   Requested Specialty: Cardiology   Number of Visits Requested: 1     Diet Cardiac     Notify your health care provider if you experience any of the following:  temperature >100.4     Notify your health care provider if you experience any of the following:  persistent nausea and vomiting or diarrhea     Notify your health care provider if you experience any of the following:  severe uncontrolled pain     Activity as tolerated       Significant Diagnostic Studies: Labs: BMP:   Recent Labs   Lab 01/29/24 0319 01/30/24 0226   * 92    136   K 4.3 4.0   CL 99 101   CO2 25 22*   BUN 21* 23*   CREATININE 1.3 1.0   CALCIUM 9.3 9.8   MG 1.8 2.0    and CBC   Recent Labs   Lab 01/29/24 0319 01/30/24 0226   WBC 9.42 9.76   HGB 19.4* 19.8*   HCT 55.4* 56.7*    374       Pending Diagnostic Studies:       None           Medications:  Reconciled Home Medications:      Medication List        START taking these medications      aspirin 81 MG Chew  Take 1 tablet (81 mg total) by mouth once daily.  Start taking on: January 31, 2024     carvediloL 3.125 MG tablet  Commonly known as: COREG  Take 1 tablet (3.125 mg total) by mouth 2 (two) times daily.     furosemide 40 MG tablet  Commonly known as: LASIX  Take 1 tablet (40 mg total) by mouth once daily.  Start taking on: January 31, 2024     lisinopriL 2.5 MG tablet  Commonly known as: PRINIVIL,ZESTRIL  Take 1 tablet (2.5 mg total) by mouth once daily.  Start taking on: January 31, 2024            CONTINUE taking these medications      HYDROcodone-acetaminophen  mg per tablet  Commonly known as: NORCO  Take 1 tablet by mouth every 8 (eight) hours as needed for Pain.     tamsulosin 0.4 mg Cap  Commonly known as:  FLOMAX  Take 1 capsule (0.4 mg total) by mouth once daily.              Indwelling Lines/Drains at time of discharge:   Lines/Drains/Airways       None                   Time spent on the discharge of patient: > minutes         Arabella Jama MD  Department of [facility] Medicine  [facility]

## 2024-01-30 NOTE — ASSESSMENT & PLAN NOTE
Chronic, controlled. Latest blood pressure and vitals reviewed-     Temp:  [97.8 °F (36.6 °C)-98.6 °F (37 °C)]   Pulse:  []   Resp:  [18-20]   BP: (103-131)/(57-81)   SpO2:  [90 %-95 %] .   Home meds for hypertension were reviewed and noted below.     While in the hospital, will manage blood pressure as follows; Continue home antihypertensive regimen    Will utilize p.r.n. blood pressure medication only if patient's blood pressure greater than 160/100 and he develops symptoms such as worsening chest pain or shortness of breath.  -continue with Coreg, Lisinopril and Lasix  -low-sodium diet  -monitor vitals

## 2024-02-01 NOTE — PROGRESS NOTES
Spoke with patient who confirmed he has history of Hepatitis C and was treated 15yrs ago. Patient denies any high risk behaviors. Informed Dr. Jamel Mancia of patient's abnormal Hep C antibody and what the patient reported. No further testing indicated at this time. Recommended to patient to follow up with PCP. Patient verbalized understanding and appreciation.

## 2024-02-05 NOTE — DISCHARGE SUMMARY
[facility] Medicine  Discharge Summary      Patient Name: Yeison Manuel  MRN: [id number]  SUBHA: [id number]  Patient Class: IP- Inpatient  Admission Date: 1/25/2024  Hospital Length of Stay: 1 days  Discharge Date and Time: 1/27/2024  7:07 AM  Attending Physician: Leora att. providers found   Discharging Provider: Garrick Muller MD  Primary Care Provider: Leora, Primary Doctor    Primary Care Team: Networked reference to record PCT     HPI:   The patient is a 52 y/o male with no known PMH who presents with SOB. SOB has been present over the past week and has been worsening. He has BEAUCHAMP and orthopnea. No known history of lung or hear problems. No swelling of the extremities or abdomen, CP, nausea, vomiting or other symptoms. Work up in the ER suggests new CHF. He reports feeling improved after being given lasix and nebs.     * No surgery found *      Hospital Course:   Patient admitted for acute HFrEF and troponin elevation. Concern for unstable angina with exertional dyspnea and chest pressure. Cardiology consulted and recommended transfer. Transfer process initiated.     Goals of Care Treatment Preferences:  Code Status: Full Code      Consults:   Consults (From admission, onward)          Status Ordering Provider     Inpatient consult to Registered Dietitian/Nutritionist  Once        Provider:  (Not yet assigned)    FILIPPO Espinal            No new Assessment & Plan notes have been filed under this hospital service since the last note was generated.  Service: Hospital Medicine    Final Active Diagnoses:    Diagnosis Date Noted POA    PRINCIPAL PROBLEM:  Unstable angina [I20.0] 01/26/2024 Yes    Acute decompensated heart failure [I50.9] 01/26/2024 Yes    Hypertension [I10] 01/26/2024 Yes    Tobacco abuse [Z72.0] 01/26/2024 Yes    Methamphetamine use [F15.10] 01/26/2024 Yes    COPD (chronic obstructive pulmonary disease) [J44.9] 01/26/2024 Yes      Problems Resolved During this  Admission:    Diagnosis Date Noted Date Resolved POA    Volume overload [E87.70] 01/25/2024 01/26/2024 Yes       Discharged Condition: stable    Disposition: Short Term Hospital    Follow Up:    Patient Instructions:   No discharge procedures on file.    Significant Diagnostic Studies: N/A    Pending Diagnostic Studies:       None           Medications:  None    Indwelling Lines/Drains at time of discharge:   Lines/Drains/Airways       None                   Time spent on the discharge of patient: 15 minutes         Garrick Muller MD  Department of [facility] Medicine  [place] - Intensive Care

## 2024-02-05 NOTE — HOSPITAL COURSE
Patient admitted for acute HFrEF and troponin elevation. Concern for unstable angina with exertional dyspnea and chest pressure. Cardiology consulted and recommended transfer. Transfer process initiated.

## 2024-02-06 NOTE — PHYSICIAN QUERY
PT Name: Yeison Manuel  MR #: [id number]     DOCUMENTATION CLARIFICATION     CDS/: JUAN FRANCISCO Gutierrez, RN, CCDS              Contact information: [email or web address]  This form is a permanent document in the medical record.     Query Date: February 6, 2024    By submitting this query, we are merely seeking further clarification of documentation.  Please utilize your independent clinical judgment when addressing the question(s) below.    The Medical Record contains the following   Indicators Supporting Clinical Findings Location in Medical Record   X Heart Failure documented Acute decompensated heart failure     New Onset of HF     PRINCIPAL PROBLEM:  Acute decompensated heart failure    H & P: Dr. Fairbanks 1/27    Cards: Dr. Gilmore 1/28    DCS: Dr. Jama 1/30   X BNP 1854   Lab: 1/25   X EF/Echo severely reduced systolic function with a visually estimated ejection fraction of 15 - 20%. Ejection fraction by visual approximation is 20%. Global longitudinal strain is -4%. Severly reduced. There is diastolic dysfunction    Echo: 1/26   X Radiology findings Findings consistent with mild congestive heart failure small bilateral pleural effusions    CXR: 1/25   X Subjective/Objective Respiratory Conditions Positive for chest tightness and shortness of breath     Dyspnea on exertion  H & P: Dr. Fairbanks 1/27    Recent/Current MI      Heart Transplant, LVAD      Edema, JVD      Ascites     X Diuretics/Meds furosemide injection 40 mg  Dose: 40 mg  Freq: 2 times daily Route: IV  Start: 01/27/24 2100 End: 01/30/24 0923    MAR    Other Treatment      Other       Heart failure is a clinical diagnosis which includes symptomatic fluid retention, elevated intracardiac pressures, and/or the inability of the heart to deliver adequate blood flow.    Heart Failure with reduced Ejection Fraction (HFrEF) or Systolic Heart Failure (loses ability to contract normally, EF is <40%)    Heart Failure with preserved Ejection Fraction  (HFpEF) or Diastolic Heart Failure (stiff ventricles, does not relax properly, EF is >50%)     Heart Failure with Combined Systolic and Diastolic Failure (stiff ventricles, does not relax properly and EF is <50%)    Mid-range or mildly reduced ejection fraction (HFmrEF) is classified as systolic heart failure.  Congestive heart failure with a recovered EF is classified as Diastolic Heart Failure.  Common clues to acute exacerbation:  Rapidly progressive symptoms (w/in 2 weeks of presentation), using IV diuretics, using supplemental O2, pulmonary edema on Xray, new or worsening pleural effusion, +JVD or other signs of volume overload, MI w/in 4 weeks, and/or BNP >500  The clinical guidelines noted are only system guidelines, and do not replace the providers clinical judgment.    Provider, please specify the type/acuity of CHF diagnosis associated with the above clinical findings.    [ x  ]  Acute Combined Systolic and Diastolic Heart Failure - new diagnosis   [   ]  Acute on Chronic Combined Systolic and Diastolic Heart Failure - worsening of CHF signs/symptoms in preexisting CHF   [   ]  Other (please specify): ___________________________________       Please document in your progress notes daily for the duration of treatment until resolved and include in your discharge summary.    References:  American Heart Association editorial staff. (2017, May). Ejection Fraction Heart Failure Measurement. American Heart Association. https://www.heart.org/en/health-topics/heart-failure/diagnosing-heart-failure/ejection-fraction-heart-failure-measurement#:~:text=Ejection%20fraction%20(EF)%20is%20a,pushed%20out%20with%20each%20heartbeat  CHRISTA Avila (2020, December 15). Heart failure with preserved ejection fraction: Clinical manifestations and diagnosis. [employer]. https://www.EVIIVO.com/contents/heart-failure-with-preserved-ejection-fraction-clinical-manifestations-and-diagnosis.  ICD-10-CM/PCS Coding Clinic Third Quarter  ICD-10, Effective with discharges: September 8, 2020 [facility] § Heart failure with mid-range or mildly reduced ejection fraction (2020).  ICD-10-CM/PCS Coding Clinic Third Quarter ICD-10, Effective with discharges: September 8, 2020 [facility] § Heart failure with recovered ejection fraction (2020).  Form No. [id number]

## 2024-05-27 ENCOUNTER — HOSPITAL ENCOUNTER (INPATIENT)
Facility: HOSPITAL | Age: 54
LOS: 2 days | Discharge: HOME | DRG: 445 | End: 2024-05-29
Attending: STUDENT IN AN ORGANIZED HEALTH CARE EDUCATION/TRAINING PROGRAM | Admitting: INTERNAL MEDICINE
Payer: COMMERCIAL

## 2024-05-27 DIAGNOSIS — F19.90 DRUG USE: ICD-10-CM

## 2024-05-27 DIAGNOSIS — K81.0 ACUTE CHOLECYSTITIS: Primary | ICD-10-CM

## 2024-05-27 DIAGNOSIS — R10.9 PAIN IN THE ABDOMEN: ICD-10-CM

## 2024-05-27 DIAGNOSIS — R10.9 ABDOMINAL PAIN: ICD-10-CM

## 2024-05-27 DIAGNOSIS — R07.9 CHEST PAIN: ICD-10-CM

## 2024-05-27 DIAGNOSIS — R79.89 ELEVATED TROPONIN: ICD-10-CM

## 2024-05-27 DIAGNOSIS — I42.7 CARDIOMYOPATHY SECONDARY TO DRUG: ICD-10-CM

## 2024-05-27 PROBLEM — I50.40 COMBINED SYSTOLIC AND DIASTOLIC CONGESTIVE HEART FAILURE: Status: ACTIVE | Noted: 2024-05-27

## 2024-05-27 LAB
ALBUMIN SERPL BCP-MCNC: 4.2 G/DL (ref 3.5–5.2)
ALP SERPL-CCNC: 81 U/L (ref 55–135)
ALT SERPL W/O P-5'-P-CCNC: 16 U/L (ref 10–44)
AMPHET+METHAMPHET UR QL: ABNORMAL
ANION GAP SERPL CALC-SCNC: 16 MMOL/L (ref 8–16)
AST SERPL-CCNC: 20 U/L (ref 10–40)
BARBITURATES UR QL SCN>200 NG/ML: NEGATIVE
BASOPHILS # BLD AUTO: 0.09 K/UL (ref 0–0.2)
BASOPHILS NFR BLD: 0.6 % (ref 0–1.9)
BENZODIAZ UR QL SCN>200 NG/ML: NEGATIVE
BILIRUB SERPL-MCNC: 1 MG/DL (ref 0.1–1)
BILIRUB UR QL STRIP: NEGATIVE
BILIRUB UR QL STRIP: NEGATIVE
BNP SERPL-MCNC: 184 PG/ML (ref 0–99)
BUN SERPL-MCNC: 13 MG/DL (ref 6–20)
BZE UR QL SCN: NEGATIVE
CALCIUM SERPL-MCNC: 9.7 MG/DL (ref 8.7–10.5)
CANNABINOIDS UR QL SCN: NEGATIVE
CHLORIDE SERPL-SCNC: 101 MMOL/L (ref 95–110)
CK SERPL-CCNC: 121 U/L (ref 20–200)
CLARITY UR: ABNORMAL
CLARITY UR: CLEAR
CO2 SERPL-SCNC: 21 MMOL/L (ref 23–29)
COLOR UR: YELLOW
COLOR UR: YELLOW
CREAT SERPL-MCNC: 1.1 MG/DL (ref 0.5–1.4)
CREAT UR-MCNC: 35.8 MG/DL (ref 23–375)
DIFFERENTIAL METHOD BLD: ABNORMAL
EOSINOPHIL # BLD AUTO: 0.4 K/UL (ref 0–0.5)
EOSINOPHIL NFR BLD: 2.8 % (ref 0–8)
ERYTHROCYTE [DISTWIDTH] IN BLOOD BY AUTOMATED COUNT: 14.3 % (ref 11.5–14.5)
EST. GFR  (NO RACE VARIABLE): >60 ML/MIN/1.73 M^2
GLUCOSE SERPL-MCNC: 114 MG/DL (ref 70–110)
GLUCOSE UR QL STRIP: NEGATIVE
GLUCOSE UR QL STRIP: NEGATIVE
HCT VFR BLD AUTO: 49.1 % (ref 40–54)
HGB BLD-MCNC: 18.3 G/DL (ref 14–18)
HGB UR QL STRIP: ABNORMAL
HGB UR QL STRIP: ABNORMAL
IMM GRANULOCYTES # BLD AUTO: 0.06 K/UL (ref 0–0.04)
IMM GRANULOCYTES NFR BLD AUTO: 0.4 % (ref 0–0.5)
KETONES UR QL STRIP: ABNORMAL
KETONES UR QL STRIP: ABNORMAL
LEUKOCYTE ESTERASE UR QL STRIP: NEGATIVE
LEUKOCYTE ESTERASE UR QL STRIP: NEGATIVE
LIPASE SERPL-CCNC: 24 U/L (ref 4–60)
LYMPHOCYTES # BLD AUTO: 2.2 K/UL (ref 1–4.8)
LYMPHOCYTES NFR BLD: 14.6 % (ref 18–48)
MCH RBC QN AUTO: 30.6 PG (ref 27–31)
MCHC RBC AUTO-ENTMCNC: 37.3 G/DL (ref 32–36)
MCV RBC AUTO: 82 FL (ref 82–98)
METHADONE UR QL SCN>300 NG/ML: NEGATIVE
MONOCYTES # BLD AUTO: 1.1 K/UL (ref 0.3–1)
MONOCYTES NFR BLD: 7.2 % (ref 4–15)
NEUTROPHILS # BLD AUTO: 11 K/UL (ref 1.8–7.7)
NEUTROPHILS NFR BLD: 74.4 % (ref 38–73)
NITRITE UR QL STRIP: NEGATIVE
NITRITE UR QL STRIP: NEGATIVE
NRBC BLD-RTO: 0 /100 WBC
OPIATES UR QL SCN: NEGATIVE
PCP UR QL SCN>25 NG/ML: NEGATIVE
PH UR STRIP: >8 [PH] (ref 5–8)
PH UR STRIP: >8 [PH] (ref 5–8)
PLATELET # BLD AUTO: 342 K/UL (ref 150–450)
PMV BLD AUTO: 9.8 FL (ref 9.2–12.9)
POTASSIUM SERPL-SCNC: 3.6 MMOL/L (ref 3.5–5.1)
PROT SERPL-MCNC: 7.5 G/DL (ref 6–8.4)
PROT UR QL STRIP: NEGATIVE
PROT UR QL STRIP: NEGATIVE
RBC # BLD AUTO: 5.98 M/UL (ref 4.6–6.2)
SODIUM SERPL-SCNC: 138 MMOL/L (ref 136–145)
SP GR UR STRIP: 1.01 (ref 1–1.03)
SP GR UR STRIP: >=1.03 (ref 1–1.03)
TOXICOLOGY INFORMATION: ABNORMAL
TROPONIN I SERPL DL<=0.01 NG/ML-MCNC: 0.1 NG/ML (ref 0–0.03)
TROPONIN I SERPL DL<=0.01 NG/ML-MCNC: 0.1 NG/ML (ref 0–0.03)
TSH SERPL DL<=0.005 MIU/L-ACNC: 3.13 UIU/ML (ref 0.4–4)
URN SPEC COLLECT METH UR: ABNORMAL
URN SPEC COLLECT METH UR: ABNORMAL
UROBILINOGEN UR STRIP-ACNC: NEGATIVE EU/DL
UROBILINOGEN UR STRIP-ACNC: NEGATIVE EU/DL
WBC # BLD AUTO: 14.81 K/UL (ref 3.9–12.7)

## 2024-05-27 PROCEDURE — 36415 COLL VENOUS BLD VENIPUNCTURE: CPT | Performed by: STUDENT IN AN ORGANIZED HEALTH CARE EDUCATION/TRAINING PROGRAM

## 2024-05-27 PROCEDURE — 25000003 PHARM REV CODE 250: Performed by: NURSE PRACTITIONER

## 2024-05-27 PROCEDURE — 84484 ASSAY OF TROPONIN QUANT: CPT | Mod: 91 | Performed by: INTERNAL MEDICINE

## 2024-05-27 PROCEDURE — 25000003 PHARM REV CODE 250: Performed by: STUDENT IN AN ORGANIZED HEALTH CARE EDUCATION/TRAINING PROGRAM

## 2024-05-27 PROCEDURE — 71045 X-RAY EXAM CHEST 1 VIEW: CPT | Mod: 26,,, | Performed by: RADIOLOGY

## 2024-05-27 PROCEDURE — 99222 1ST HOSP IP/OBS MODERATE 55: CPT | Mod: ,,, | Performed by: SPECIALIST

## 2024-05-27 PROCEDURE — 63600175 PHARM REV CODE 636 W HCPCS: Performed by: STUDENT IN AN ORGANIZED HEALTH CARE EDUCATION/TRAINING PROGRAM

## 2024-05-27 PROCEDURE — 96365 THER/PROPH/DIAG IV INF INIT: CPT

## 2024-05-27 PROCEDURE — 96366 THER/PROPH/DIAG IV INF ADDON: CPT

## 2024-05-27 PROCEDURE — 76705 ECHO EXAM OF ABDOMEN: CPT | Mod: TC

## 2024-05-27 PROCEDURE — 63600175 PHARM REV CODE 636 W HCPCS: Performed by: NURSE PRACTITIONER

## 2024-05-27 PROCEDURE — 11000001 HC ACUTE MED/SURG PRIVATE ROOM

## 2024-05-27 PROCEDURE — 81003 URINALYSIS AUTO W/O SCOPE: CPT | Mod: 59 | Performed by: STUDENT IN AN ORGANIZED HEALTH CARE EDUCATION/TRAINING PROGRAM

## 2024-05-27 PROCEDURE — 99284 EMERGENCY DEPT VISIT MOD MDM: CPT | Mod: 25

## 2024-05-27 PROCEDURE — 93005 ELECTROCARDIOGRAM TRACING: CPT

## 2024-05-27 PROCEDURE — 85025 COMPLETE CBC W/AUTO DIFF WBC: CPT | Performed by: STUDENT IN AN ORGANIZED HEALTH CARE EDUCATION/TRAINING PROGRAM

## 2024-05-27 PROCEDURE — 71045 X-RAY EXAM CHEST 1 VIEW: CPT | Mod: TC

## 2024-05-27 PROCEDURE — 84443 ASSAY THYROID STIM HORMONE: CPT | Performed by: INTERNAL MEDICINE

## 2024-05-27 PROCEDURE — 96375 TX/PRO/DX INJ NEW DRUG ADDON: CPT

## 2024-05-27 PROCEDURE — 81003 URINALYSIS AUTO W/O SCOPE: CPT | Mod: 91,59 | Performed by: INTERNAL MEDICINE

## 2024-05-27 PROCEDURE — 83880 ASSAY OF NATRIURETIC PEPTIDE: CPT | Performed by: STUDENT IN AN ORGANIZED HEALTH CARE EDUCATION/TRAINING PROGRAM

## 2024-05-27 PROCEDURE — 80307 DRUG TEST PRSMV CHEM ANLYZR: CPT | Performed by: STUDENT IN AN ORGANIZED HEALTH CARE EDUCATION/TRAINING PROGRAM

## 2024-05-27 PROCEDURE — 84484 ASSAY OF TROPONIN QUANT: CPT | Performed by: STUDENT IN AN ORGANIZED HEALTH CARE EDUCATION/TRAINING PROGRAM

## 2024-05-27 PROCEDURE — 80053 COMPREHEN METABOLIC PANEL: CPT | Performed by: STUDENT IN AN ORGANIZED HEALTH CARE EDUCATION/TRAINING PROGRAM

## 2024-05-27 PROCEDURE — 76705 ECHO EXAM OF ABDOMEN: CPT | Mod: 26,,, | Performed by: RADIOLOGY

## 2024-05-27 PROCEDURE — 25000003 PHARM REV CODE 250: Performed by: INTERNAL MEDICINE

## 2024-05-27 PROCEDURE — 83690 ASSAY OF LIPASE: CPT | Performed by: STUDENT IN AN ORGANIZED HEALTH CARE EDUCATION/TRAINING PROGRAM

## 2024-05-27 PROCEDURE — 93010 ELECTROCARDIOGRAM REPORT: CPT | Mod: ,,, | Performed by: INTERNAL MEDICINE

## 2024-05-27 PROCEDURE — 99223 1ST HOSP IP/OBS HIGH 75: CPT | Mod: ,,, | Performed by: INTERNAL MEDICINE

## 2024-05-27 PROCEDURE — 94761 N-INVAS EAR/PLS OXIMETRY MLT: CPT

## 2024-05-27 PROCEDURE — 96376 TX/PRO/DX INJ SAME DRUG ADON: CPT

## 2024-05-27 PROCEDURE — 82550 ASSAY OF CK (CPK): CPT | Performed by: STUDENT IN AN ORGANIZED HEALTH CARE EDUCATION/TRAINING PROGRAM

## 2024-05-27 PROCEDURE — 36415 COLL VENOUS BLD VENIPUNCTURE: CPT | Performed by: INTERNAL MEDICINE

## 2024-05-27 PROCEDURE — 63600175 PHARM REV CODE 636 W HCPCS: Performed by: INTERNAL MEDICINE

## 2024-05-27 RX ORDER — PROCHLORPERAZINE EDISYLATE 5 MG/ML
5 INJECTION INTRAMUSCULAR; INTRAVENOUS EVERY 6 HOURS PRN
Status: DISCONTINUED | OUTPATIENT
Start: 2024-05-27 | End: 2024-05-29 | Stop reason: HOSPADM

## 2024-05-27 RX ORDER — ALUMINUM HYDROXIDE, MAGNESIUM HYDROXIDE, AND SIMETHICONE 1200; 120; 1200 MG/30ML; MG/30ML; MG/30ML
30 SUSPENSION ORAL 4 TIMES DAILY PRN
Status: DISCONTINUED | OUTPATIENT
Start: 2024-05-27 | End: 2024-05-29 | Stop reason: HOSPADM

## 2024-05-27 RX ORDER — ONDANSETRON HYDROCHLORIDE 2 MG/ML
4 INJECTION, SOLUTION INTRAVENOUS
Status: COMPLETED | OUTPATIENT
Start: 2024-05-27 | End: 2024-05-27

## 2024-05-27 RX ORDER — HYDROCODONE BITARTRATE AND ACETAMINOPHEN 10; 325 MG/1; MG/1
1 TABLET ORAL EVERY 8 HOURS PRN
Status: DISCONTINUED | OUTPATIENT
Start: 2024-05-27 | End: 2024-05-29 | Stop reason: HOSPADM

## 2024-05-27 RX ORDER — IPRATROPIUM BROMIDE AND ALBUTEROL SULFATE 2.5; .5 MG/3ML; MG/3ML
3 SOLUTION RESPIRATORY (INHALATION) EVERY 4 HOURS PRN
Status: DISCONTINUED | OUTPATIENT
Start: 2024-05-27 | End: 2024-05-28

## 2024-05-27 RX ORDER — CARVEDILOL 3.12 MG/1
3.12 TABLET ORAL 2 TIMES DAILY
Status: DISCONTINUED | OUTPATIENT
Start: 2024-05-27 | End: 2024-05-29

## 2024-05-27 RX ORDER — LISINOPRIL 2.5 MG/1
2.5 TABLET ORAL DAILY
Status: DISCONTINUED | OUTPATIENT
Start: 2024-05-28 | End: 2024-05-29 | Stop reason: HOSPADM

## 2024-05-27 RX ORDER — NAPROXEN SODIUM 220 MG/1
81 TABLET, FILM COATED ORAL DAILY
Status: DISCONTINUED | OUTPATIENT
Start: 2024-05-28 | End: 2024-05-29 | Stop reason: HOSPADM

## 2024-05-27 RX ORDER — KETOROLAC TROMETHAMINE 30 MG/ML
15 INJECTION, SOLUTION INTRAMUSCULAR; INTRAVENOUS
Status: COMPLETED | OUTPATIENT
Start: 2024-05-27 | End: 2024-05-27

## 2024-05-27 RX ORDER — SUCRALFATE 1 G/1
1 TABLET ORAL 4 TIMES DAILY
Status: DISCONTINUED | OUTPATIENT
Start: 2024-05-27 | End: 2024-05-29 | Stop reason: HOSPADM

## 2024-05-27 RX ORDER — TALC
6 POWDER (GRAM) TOPICAL NIGHTLY PRN
Status: DISCONTINUED | OUTPATIENT
Start: 2024-05-27 | End: 2024-05-29 | Stop reason: HOSPADM

## 2024-05-27 RX ORDER — GLUCAGON 1 MG
1 KIT INJECTION
Status: DISCONTINUED | OUTPATIENT
Start: 2024-05-27 | End: 2024-05-29 | Stop reason: HOSPADM

## 2024-05-27 RX ORDER — POLYETHYLENE GLYCOL 3350 17 G/17G
17 POWDER, FOR SOLUTION ORAL DAILY
Status: DISCONTINUED | OUTPATIENT
Start: 2024-05-28 | End: 2024-05-29 | Stop reason: HOSPADM

## 2024-05-27 RX ORDER — MORPHINE SULFATE 2 MG/ML
4 INJECTION, SOLUTION INTRAMUSCULAR; INTRAVENOUS
Status: COMPLETED | OUTPATIENT
Start: 2024-05-27 | End: 2024-05-27

## 2024-05-27 RX ORDER — HEPARIN SODIUM 5000 [USP'U]/ML
5000 INJECTION, SOLUTION INTRAVENOUS; SUBCUTANEOUS EVERY 8 HOURS
Status: DISCONTINUED | OUTPATIENT
Start: 2024-05-27 | End: 2024-05-29 | Stop reason: HOSPADM

## 2024-05-27 RX ORDER — SODIUM CHLORIDE 9 MG/ML
1000 INJECTION, SOLUTION INTRAVENOUS
Status: COMPLETED | OUTPATIENT
Start: 2024-05-27 | End: 2024-05-27

## 2024-05-27 RX ORDER — SIMETHICONE 80 MG
1 TABLET,CHEWABLE ORAL 4 TIMES DAILY PRN
Status: DISCONTINUED | OUTPATIENT
Start: 2024-05-27 | End: 2024-05-29 | Stop reason: HOSPADM

## 2024-05-27 RX ORDER — ALBUTEROL SULFATE 0.83 MG/ML
2.5 SOLUTION RESPIRATORY (INHALATION) EVERY 4 HOURS PRN
Status: DISCONTINUED | OUTPATIENT
Start: 2024-05-27 | End: 2024-05-29 | Stop reason: HOSPADM

## 2024-05-27 RX ORDER — HYDROMORPHONE HYDROCHLORIDE 1 MG/ML
1 INJECTION, SOLUTION INTRAMUSCULAR; INTRAVENOUS; SUBCUTANEOUS
Status: COMPLETED | OUTPATIENT
Start: 2024-05-27 | End: 2024-05-27

## 2024-05-27 RX ORDER — TAMSULOSIN HYDROCHLORIDE 0.4 MG/1
0.4 CAPSULE ORAL DAILY
Status: DISCONTINUED | OUTPATIENT
Start: 2024-05-28 | End: 2024-05-29 | Stop reason: HOSPADM

## 2024-05-27 RX ORDER — LEVOFLOXACIN 5 MG/ML
750 INJECTION, SOLUTION INTRAVENOUS
Status: COMPLETED | OUTPATIENT
Start: 2024-05-27 | End: 2024-05-27

## 2024-05-27 RX ORDER — CIPROFLOXACIN 2 MG/ML
400 INJECTION, SOLUTION INTRAVENOUS
Status: DISCONTINUED | OUTPATIENT
Start: 2024-05-27 | End: 2024-05-29 | Stop reason: HOSPADM

## 2024-05-27 RX ORDER — ONDANSETRON HYDROCHLORIDE 2 MG/ML
4 INJECTION, SOLUTION INTRAVENOUS EVERY 8 HOURS PRN
Status: DISCONTINUED | OUTPATIENT
Start: 2024-05-27 | End: 2024-05-29 | Stop reason: HOSPADM

## 2024-05-27 RX ORDER — NALOXONE HCL 0.4 MG/ML
0.02 VIAL (ML) INJECTION
Status: DISCONTINUED | OUTPATIENT
Start: 2024-05-27 | End: 2024-05-29 | Stop reason: HOSPADM

## 2024-05-27 RX ORDER — ACETAMINOPHEN 325 MG/1
650 TABLET ORAL EVERY 4 HOURS PRN
Status: DISCONTINUED | OUTPATIENT
Start: 2024-05-27 | End: 2024-05-29 | Stop reason: HOSPADM

## 2024-05-27 RX ORDER — METRONIDAZOLE 500 MG/100ML
500 INJECTION, SOLUTION INTRAVENOUS
Status: DISCONTINUED | OUTPATIENT
Start: 2024-05-27 | End: 2024-05-29 | Stop reason: HOSPADM

## 2024-05-27 RX ORDER — MORPHINE SULFATE 2 MG/ML
2 INJECTION, SOLUTION INTRAMUSCULAR; INTRAVENOUS EVERY 4 HOURS PRN
Status: DISCONTINUED | OUTPATIENT
Start: 2024-05-27 | End: 2024-05-29 | Stop reason: HOSPADM

## 2024-05-27 RX ADMIN — ONDANSETRON 4 MG: 2 INJECTION INTRAMUSCULAR; INTRAVENOUS at 10:05

## 2024-05-27 RX ADMIN — ONDANSETRON 4 MG: 2 INJECTION INTRAMUSCULAR; INTRAVENOUS at 12:05

## 2024-05-27 RX ADMIN — PROCHLORPERAZINE EDISYLATE 5 MG: 5 INJECTION INTRAMUSCULAR; INTRAVENOUS at 08:05

## 2024-05-27 RX ADMIN — CARVEDILOL 3.12 MG: 3.12 TABLET, FILM COATED ORAL at 08:05

## 2024-05-27 RX ADMIN — SODIUM CHLORIDE 1000 ML: 9 INJECTION, SOLUTION INTRAVENOUS at 10:05

## 2024-05-27 RX ADMIN — MORPHINE SULFATE 4 MG: 2 INJECTION, SOLUTION INTRAMUSCULAR; INTRAVENOUS at 12:05

## 2024-05-27 RX ADMIN — Medication 6 MG: at 08:05

## 2024-05-27 RX ADMIN — KETOROLAC TROMETHAMINE 15 MG: 30 INJECTION, SOLUTION INTRAMUSCULAR at 10:05

## 2024-05-27 RX ADMIN — HYDROMORPHONE HYDROCHLORIDE 1 MG: 1 INJECTION, SOLUTION INTRAMUSCULAR; INTRAVENOUS; SUBCUTANEOUS at 02:05

## 2024-05-27 RX ADMIN — SUCRALFATE 1 G: 1 TABLET ORAL at 08:05

## 2024-05-27 RX ADMIN — LEVOFLOXACIN 750 MG: 750 INJECTION, SOLUTION INTRAVENOUS at 01:05

## 2024-05-27 RX ADMIN — ALUMINUM HYDROXIDE, MAGNESIUM HYDROXIDE, AND SIMETHICONE 30 ML: 200; 200; 20 SUSPENSION ORAL at 08:05

## 2024-05-27 RX ADMIN — SUCRALFATE 1 G: 1 TABLET ORAL at 05:05

## 2024-05-27 RX ADMIN — CIPROFLOXACIN 400 MG: 400 INJECTION, SOLUTION INTRAVENOUS at 06:05

## 2024-05-27 RX ADMIN — METRONIDAZOLE 500 MG: 500 INJECTION, SOLUTION INTRAVENOUS at 05:05

## 2024-05-27 RX ADMIN — HEPARIN SODIUM 5000 UNITS: 5000 INJECTION INTRAVENOUS; SUBCUTANEOUS at 09:05

## 2024-05-27 RX ADMIN — ONDANSETRON 4 MG: 2 INJECTION INTRAMUSCULAR; INTRAVENOUS at 05:05

## 2024-05-27 NOTE — ASSESSMENT & PLAN NOTE
Chronic, controlled. Latest blood pressure and vitals reviewed-     Temp:  [97.4 °F (36.3 °C)-97.7 °F (36.5 °C)]   Pulse:  []   Resp:  [14-23]   BP: (130-168)/()   SpO2:  [94 %-99 %] .   Home meds for hypertension were reviewed and noted below.   Hypertension Medications               carvediloL (COREG) 3.125 MG tablet Take 1 tablet (3.125 mg total) by mouth 2 (two) times daily.    furosemide (LASIX) 40 MG tablet Take 1 tablet (40 mg total) by mouth once daily.    lisinopriL (PRINIVIL,ZESTRIL) 2.5 MG tablet Take 1 tablet (2.5 mg total) by mouth once daily.            While in the hospital, will manage blood pressure as follows; Continue home antihypertensive regimen    Will utilize p.r.n. blood pressure medication only if patient's blood pressure greater than 180/110 and he develops symptoms such as worsening chest pain or shortness of breath.

## 2024-05-27 NOTE — ED PROVIDER NOTES
Encounter Date: 5/27/2024       History     Chief Complaint   Patient presents with    Abdominal Pain     RUQ pain     EMS to ED alone. Patient complains of epigastric pain with vomiting onset 05/24/2024. Treated at the ED 05/25, 05/26, 05/27. Denies chest pain. Complains of shortness of breath. Denies fever or diarrhea. Decreased PO. Denies URI. Denies swelling. History of CHF with EF of approximately 20% per echo report dated 01/26/2024. Hx of meth use, last use per patient was about 1 week ago. At arrival, patient is uncomfortable, short of breath, moaning in exam bed. Per EMS, given aspirin 324 and Ntg X 4 doses PTA. Denies tobacco use.  @1240, states has Meth use was 05/23/2024. Last PO was 05/25/2024, he has not been able to eat or drink due to pain and vomiting.        The history is provided by the patient and the EMS personnel.     Review of patient's allergies indicates:   Allergen Reactions    Penicillins Swelling    Codeine Nausea Only     Past Medical History:   Diagnosis Date    Renal disorder      No past surgical history on file.  No family history on file.  Social History     Tobacco Use    Smoking status: Every Day    Smokeless tobacco: Never   Substance Use Topics    Alcohol use: Not Currently    Drug use: Not Currently     Types: Cocaine, Methamphetamines     Review of Systems   Constitutional:  Positive for appetite change. Negative for chills and fever.   Respiratory:  Positive for shortness of breath. Negative for cough.    Cardiovascular:  Negative for chest pain, palpitations and leg swelling.   Gastrointestinal:  Positive for abdominal pain, nausea and vomiting. Negative for diarrhea.   All other systems reviewed and are negative.      Physical Exam     Initial Vitals [05/27/24 1021]   BP Pulse Resp Temp SpO2   (!) 165/89 103 (!) 22 97.4 °F (36.3 °C) 97 %      MAP       --         Physical Exam    Nursing note and vitals reviewed.  Constitutional: He appears well-developed and  well-nourished.   uncomfortable   HENT:   Head: Normocephalic and atraumatic.   Mouth/Throat: Oropharynx is clear and moist.   Eyes: Pupils are equal, round, and reactive to light.   Neck:   Normal range of motion.  Cardiovascular:  Regular rhythm and normal heart sounds.           Tachycardia, no dependent edema   Pulmonary/Chest: Breath sounds normal. He has no wheezes. He has no rhonchi.   Tachypnea    Abdominal: Abdomen is soft. Bowel sounds are normal. He exhibits no distension.   Tenderness RUQ and epigastric area   Musculoskeletal:         General: Normal range of motion.      Cervical back: Normal range of motion.     Neurological: He is alert and oriented to person, place, and time. He has normal strength. GCS score is 15. GCS eye subscore is 4. GCS verbal subscore is 5. GCS motor subscore is 6.   Skin: Skin is warm and dry. Capillary refill takes less than 2 seconds.   flushed   Psychiatric: He has a normal mood and affect. Thought content normal.         ED Course   Procedures  Labs Reviewed   CBC W/ AUTO DIFFERENTIAL - Abnormal; Notable for the following components:       Result Value    WBC 14.81 (*)     Hemoglobin 18.3 (*)     MCHC 37.3 (*)     Gran # (ANC) 11.0 (*)     Immature Grans (Abs) 0.06 (*)     Mono # 1.1 (*)     Gran % 74.4 (*)     Lymph % 14.6 (*)     All other components within normal limits   COMPREHENSIVE METABOLIC PANEL - Abnormal; Notable for the following components:    CO2 21 (*)     Glucose 114 (*)     All other components within normal limits   URINALYSIS, REFLEX TO URINE CULTURE - Abnormal; Notable for the following components:    pH, UA >8.0 (*)     Ketones, UA 1+ (*)     Occult Blood UA Trace (*)     All other components within normal limits    Narrative:     Preferred Collection Type->Urine, Clean Catch  Specimen Source->Urine   B-TYPE NATRIURETIC PEPTIDE - Abnormal; Notable for the following components:     (*)     All other components within normal limits   TROPONIN I -  Abnormal; Notable for the following components:    Troponin I 0.095 (*)     All other components within normal limits   DRUG SCREEN PANEL, URINE EMERGENCY - Abnormal; Notable for the following components:    Amphetamine Screen, Ur Presumptive Positive (*)     All other components within normal limits    Narrative:     Preferred Collection Type->Urine, Clean Catch  Specimen Source->Urine   LIPASE   B-TYPE NATRIURETIC PEPTIDE   DRUG SCREEN PANEL, URINE EMERGENCY   CK   CK   TROPONIN I     EKG Readings: (Independently Interpreted)   Initial Reading: No STEMI. Previous EKG: Compared with most recent EKG Previous EKG Date: 05/27/2024. Rhythm: Sinus Tachycardia. Heart Rate: 103. Ectopy: No Ectopy. ST Segments: Normal ST Segments. T Waves Flipped: V4, V5 and V6. Axis: Left Axis Deviation. Clinical Impression: Sinus Tachycardia Other Impression: no acute STEMI   @1020, reviewed and discussed with Dr Kirkland, no acute STEMI, was tachycardia rate 103, by atrial enlargement, left axis deviation inverted T-waves       Imaging Results               US Abdomen Limited_Gallbladder (Final result)  Result time 05/27/24 12:22:22      Final result by Maryellen Sparks MD (05/27/24 12:22:22)                   Impression:      There are sonographic findings concerning for acute cholecystitis.  There is sludge within the gallbladder, mild gallbladder wall thickening of the sonographer reports a positive Anderson's sign.  This report was flagged in Epic as abnormal.      Electronically signed by: Maryellen Sparks MD  Date:    05/27/2024  Time:    12:22               Narrative:    EXAMINATION:  US ABDOMEN LIMITED_GALLBLADDER    CLINICAL HISTORY:  epigastric pain;    TECHNIQUE:  Limited ultrasound of the right upper quadrant of the abdomen focused on the biliary system was performed.    COMPARISON:  None    FINDINGS:  Gallbladder: Sludge is seen within the gallbladder.  The sonographer reports a positive Anderson's sign.  The gallbladder wall  is thickened at 3 mm.    Biliary system: The common duct is not dilated, measuring 4.9 mm.  No intrahepatic ductal dilatation.    Pancreas: The visualized portions of pancreas appear normal.    Miscellaneous: No upper abdominal ascites and no abnormalities of the visualized liver.                                       X-Ray Chest AP Portable (Final result)  Result time 05/27/24 11:16:00      Final result by Maryellen Sparks MD (05/27/24 11:16:00)                   Impression:      No acute abnormality.      Electronically signed by: Maryellen Sparks MD  Date:    05/27/2024  Time:    11:16               Narrative:    EXAMINATION:  XR CHEST AP PORTABLE    CLINICAL HISTORY:  abdomen pain;    TECHNIQUE:  Single frontal view of the chest was performed.    COMPARISON:  01/29/2024    FINDINGS:  The lungs are clear, with normal appearance of pulmonary vasculature and no pleural effusion or pneumothorax.    The cardiac silhouette is normal in size. The hilar and mediastinal contours are unremarkable.    Bones are intact.                                    X-Rays:   Independently Interpreted Readings:   Other Readings:  EXAMINATION:  US ABDOMEN LIMITED_GALLBLADDER     CLINICAL HISTORY:  epigastric pain;     TECHNIQUE:  Limited ultrasound of the right upper quadrant of the abdomen focused on the biliary system was performed.     COMPARISON:  None     FINDINGS:  Gallbladder: Sludge is seen within the gallbladder.  The sonographer reports a positive Anderson's sign.  The gallbladder wall is thickened at 3 mm.     Biliary system: The common duct is not dilated, measuring 4.9 mm.  No intrahepatic ductal dilatation.     Pancreas: The visualized portions of pancreas appear normal.     Miscellaneous: No upper abdominal ascites and no abnormalities of the visualized liver.     Impression:     There are sonographic findings concerning for acute cholecystitis.  There is sludge within the gallbladder, mild gallbladder wall thickening of  the sonographer reports a positive Anderson's sign.  This report was flagged in Epic as abnormal.  EXAMINATION:  XR CHEST AP PORTABLE     CLINICAL HISTORY:  abdomen pain;     TECHNIQUE:  Single frontal view of the chest was performed.     COMPARISON:  01/29/2024     FINDINGS:  The lungs are clear, with normal appearance of pulmonary vasculature and no pleural effusion or pneumothorax.     The cardiac silhouette is normal in size. The hilar and mediastinal contours are unremarkable.     Bones are intact.     Impression:     No acute abnormality.      Medications   ketorolac injection 15 mg (15 mg Intravenous Given 5/27/24 1025)   ondansetron injection 4 mg (4 mg Intravenous Given 5/27/24 1025)   0.9%  NaCl infusion ( Intravenous Verify Only 5/27/24 1409)   morphine injection 4 mg (4 mg Intravenous Given 5/27/24 1245)   ondansetron injection 4 mg (4 mg Intravenous Given 5/27/24 1244)   levoFLOXacin 750 mg/150 mL IVPB 750 mg ( Intravenous Verify Only 5/27/24 1409)   HYDROmorphone injection 1 mg (1 mg Intravenous Given 5/27/24 1406)     Medical Decision Making  Presents for evaluation of abdomen pain, history of CHF and drug use. Disposition pending, see HPI  Differentials including but not limited to dehydration, volume depletion, abnormal electrolytes, anemia, acute MI, abnormal EKG, abdominal pain, drug use, drug withdrawal, gastroenteritis, cholecystitis, cholelithiasis, perforation, GERD, pneumonia, CHF  @1053, CT department notified to hold CT, US ordered, WBC elevated. Patient states he has gallstones, CT with IV contrast 05/25/24 WNL. There is not a gallbladder US on record for patient.  @1240, ultrasound noting acute cholecystitis.  Discussed case with Dr. Quinn.  He was see the patient in the ED shortly.  @1330, Dr Quinn sees patient in ED. Requesting Hospital Medicine admission for medical clearance. Consulted Dr Walker, accepts admission for Hospital Medicine.  Patient will be admitted inpatient, diagnosis  acute cholecystitis, drug use--methamphetamines, elevated troponin-chronic in nature. NS maintenance at 100 ml/hr in ED, Hx of CHF and EF approximately 20 %.  Pain control in the ED. Levaquin IV.  Patient agrees with plan of care. Discussed with Dr Kirkland, sees patient in ED. Discussed with Dr Quinn, sees patient in ED        Amount and/or Complexity of Data Reviewed  Labs: ordered. Decision-making details documented in ED Course.  Radiology: ordered. Decision-making details documented in ED Course.    Risk  Prescription drug management.  Decision regarding hospitalization.               ED Course as of 05/27/24 1649   Mon May 27, 2024   1228 BNP(!): 184     Drug screen panel, in-house    Final resultCollected 05/27 1120    Amphetamines, Urine Presumptive Positive    Comp. Metabolic Panel    Final resultCollected 05/27 1027    CO2 21  Glucose 114   Urinalysis, Reflex to Urine Culture Urine, Clean Catch    Final resultCollected 05/27 1120    pH, UA >8.0  Ketones, UA 1+  Blood, UA Trace    Troponin I    Final resultCollected 05/27 1027    Troponin I 0.095    BNP    Final resultCollected 05/27 1027        CBC W/ AUTO DIFFERENTIAL    Final resultCollected 05/27 1027    WBC 14.81  Hemoglobin 18.3  MCHC 37.3  Gran # (ANC) 11.0  Immature Grans (Abs) 0.06  Mono # 1.1          [CP]      ED Course User Index  [CP] Miriam Schreiber NP                           Clinical Impression:  Final diagnoses:  [R10.9] Abdominal pain  [R10.9] Pain in the abdomen  [K81.0] Acute cholecystitis (Primary)  [F19.90] Drug use - Methamphetamines  [R79.89] Elevated troponin - chronic in nature  [R79.89] Elevated troponin          ED Disposition Condition    Admit Stable                Miriam Schreiber NP  05/27/24 1044       Miriam Schreiber NP  05/27/24 1101       Miriam Schreiber NP  05/27/24 1101       Miriam Schreiber NP  05/27/24 1102       Miriam Schreiber NP  05/27/24 1121        Miriam Schreiber, NP  05/27/24 1228       Miriam Schreiber, NP  05/27/24 1240       Miriam Schreiber, NP  05/27/24 1340       Miriam Schreiber, NP  05/27/24 1344       Miriam Schreiber, NP  05/27/24 1355       Miriam Schreiber, BLACK  05/27/24 1644

## 2024-05-27 NOTE — CONSULTS
[facility] Emergency Dept  General Surgery  Consult Note    Patient Name: Yeison Manuel  MRN: [id number]  Admission Date: 5/27/2024  Attending Physician: Chidi Kirkland MD   Consult Physician: Saad Quinn MD  Primary Care Provider: Leora, Primary Doctor    Patient information was obtained from patient, relative(s), and ER records.     Subjective:     Reason for consultation:   Acute cholecystitis    History of Present Illness:  Yeison Manuel is a 53 y.o. male with a history of  substance abuse and chronic cardiac issue presents with  2-3 day history of epigastric and right upper quadrant abdominal discomfort.  He has had intermittent episodes of this in the past but this is the worst it has ever been according to family member it bit said.  I believe it is instead.  He reports this is the worst it has ever been.  He was worked up for cardiac issues over the weekend sent home.  He presented today with worsening complaints and had an ultrasound that was consistent with the acute cholecystitis.  Be made it to the hospital for medical clearance followed by cholecystectomy.  Surgery services have been utilized for consultation and removal of gallbladder.  By history has an ejection fraction a proximally 20% .  Also has history of exploratory laparotomy for trauma 20-30 years ag    Review of patient's allergies indicates:   Allergen Reactions    Penicillins Swelling    Codeine Nausea Only       Past Medical History:   Diagnosis Date    Renal disorder      No past surgical history on file.  Family History    None       Tobacco Use    Smoking status: Every Day    Smokeless tobacco: Never   Substance and Sexual Activity    Alcohol use: Not Currently    Drug use: Not Currently     Types: Cocaine, Methamphetamines    Sexual activity: Yes     Review of Systems   Gastrointestinal:  Positive for abdominal pain.        Epigastric and right upper quadrant abdominal discomfort intermittently over the last year.  Gotten worse in  nature of the last several days.      Objective:     Vital Signs (Most Recent):  Temp: 97.4 °F (36.3 °C) (05/27/24 1021)  Pulse: (!) 54 (05/27/24 1121)  Resp: 19 (05/27/24 1245)  BP: (!) 165/89 (05/27/24 1021)  SpO2: 96 % (05/27/24 1121) Vital Signs (24h Range):  Temp:  [97.4 °F (36.3 °C)] 97.4 °F (36.3 °C)  Pulse:  [] 54  Resp:  [18-22] 19  SpO2:  [96 %-97 %] 96 %  BP: (165)/(89) 165/89     Weight: 83.9 kg (185 lb)    Body mass index is 23.75 kg/m².    Review of Systems   Gastrointestinal:  Positive for abdominal pain.        Epigastric and right upper quadrant abdominal discomfort intermittently over the last year.  Gotten worse in nature of the last several days.        Physical Exam  Vitals and nursing note reviewed.   Constitutional:       Appearance: Normal appearance. He is normal weight.   HENT:      Head: Normocephalic and atraumatic.      Nose: Nose normal.      Mouth/Throat:      Mouth: Mucous membranes are moist.   Eyes:      Extraocular Movements: Extraocular movements intact.      Pupils: Pupils are equal, round, and reactive to light.   Cardiovascular:      Rate and Rhythm: Normal rate and regular rhythm.   Abdominal:      General: Abdomen is flat.      Palpations: Abdomen is soft.      Tenderness: There is abdominal tenderness. There is guarding.          Comments: Or which he underwent a splenectomy   Musculoskeletal:         General: Normal range of motion.      Cervical back: Normal range of motion.   Skin:     General: Skin is warm.   Neurological:      General: No focal deficit present.      Mental Status: He is alert and oriented to person, place, and time. Mental status is at baseline.   Psychiatric:         Mood and Affect: Mood normal.         Behavior: Behavior normal.         Significant Labs:  CBC:   Recent Labs   Lab 05/27/24  1027   WBC 14.81*   RBC 5.98   HGB 18.3*   HCT 49.1      MCV 82   MCH 30.6   MCHC 37.3*     BMP:   Recent Labs   Lab 05/27/24  1027   *   NA  "138   K 3.6      CO2 21*   BUN 13   CREATININE 1.1   CALCIUM 9.7     CMP:   Recent Labs   Lab 05/27/24  1027   *   CALCIUM 9.7   ALBUMIN 4.2   PROT 7.5      K 3.6   CO2 21*      BUN 13   CREATININE 1.1   ALKPHOS 81   ALT 16   AST 20   BILITOT 1.0     LFTs:   Recent Labs   Lab 05/27/24  1027   ALT 16   AST 20   ALKPHOS 81   BILITOT 1.0   PROT 7.5   ALBUMIN 4.2     Coagulation: No results for input(s): "LABPROT", "INR", "APTT" in the last 168 hours.  Specimen (24h ago, onward)      None          Recent Labs   Lab 05/27/24  1120   COLORU Yellow   SPECGRAV 1.015   PHUR >8.0*   PROTEINUA Negative   NITRITE Negative   LEUKOCYTESUR Negative   UROBILINOGEN Negative       Significant Diagnostics:  I have reviewed all pertinent imaging results/findings within the past 24 hours.  I have reviewed and interpreted all pertinent imaging results/findings within the past 24 hours.    Assessment:   Yeison Manuel is a 53 y.o. male who presents with  epigastric and right upper quadrant abdominal pain worse of the   Last several days.  Ultrasound consistent with acute cholecystitis.. patient to be admitted for medical clearance followed by cholecystectomy performed laparoscopically.  Patient has a history of open splenectomy.    There are no hospital problems to display for this patient.    VTE Risk Mitigation (From admission, onward)      None            Medical Decision Making/Plan:   moderate    Saad Quinn MD  General Surgery  [place] - Emergency Dept  "

## 2024-05-27 NOTE — ASSESSMENT & PLAN NOTE
Patient is identified as having Combined Systolic and Diastolic heart failure that is Chronic. CHF is currently controlled. Latest ECHO performed and demonstrates- Results for orders placed during the hospital encounter of 01/25/24    Echo    Interpretation Summary    Left Ventricle: The left ventricle is moderately dilated. Moderately increased wall thickness. Severe global hypokinesis present. There is severely reduced systolic function with a visually estimated ejection fraction of 15 - 20%. Ejection fraction by visual approximation is 20%. Global longitudinal strain is -4%. Severly reduced. There is diastolic dysfunction.    Right Ventricle: Normal right ventricular cavity size. Systolic function is severely reduced. TAPSE is 1.40 cm.    Left Atrium: Left atrium is moderately dilated.    Right Atrium: Right atrium is mildly dilated.    Aortic Valve: The aortic valve is a trileaflet valve.    Mitral Valve: There is moderate to severe regurgitation.    IVC/SVC: Normal venous pressure at 3 mmHg.    Pericardium: There is a small effusion. No indication of cardiac tamponade.  . Continue Furosemide and monitor clinical status closely. Monitor on telemetry. Patient is off CHF pathway.  Monitor strict Is&Os and daily weights.  Place on fluid restriction of 1.5 L. Cardiology has been consulted. Continue to stress to patient importance of self efficacy and  on diet for CHF. Last BNP reviewed- and noted below   Recent Labs   Lab 05/27/24  1027   *

## 2024-05-27 NOTE — SUBJECTIVE & OBJECTIVE
Past Medical History:   Diagnosis Date    Renal disorder        No past surgical history on file.    Review of patient's allergies indicates:   Allergen Reactions    Penicillins Swelling    Codeine Nausea Only       No current facility-administered medications on file prior to encounter.     Current Outpatient Medications on File Prior to Encounter   Medication Sig    aspirin 81 MG Chew Take 1 tablet (81 mg total) by mouth once daily.    carvediloL (COREG) 3.125 MG tablet Take 1 tablet (3.125 mg total) by mouth 2 (two) times daily.    furosemide (LASIX) 40 MG tablet Take 1 tablet (40 mg total) by mouth once daily.    HYDROcodone-acetaminophen (NORCO)  mg per tablet Take 1 tablet by mouth every 8 (eight) hours as needed for Pain.    lisinopriL (PRINIVIL,ZESTRIL) 2.5 MG tablet Take 1 tablet (2.5 mg total) by mouth once daily.    sucralfate (CARAFATE) 1 gram tablet Take 1 tablet (1 g total) by mouth 4 (four) times daily. for 10 days    tamsulosin (FLOMAX) 0.4 mg Cap Take 1 capsule (0.4 mg total) by mouth once daily.     Family History    None       Tobacco Use    Smoking status: Every Day    Smokeless tobacco: Never   Substance and Sexual Activity    Alcohol use: Not Currently    Drug use: Not Currently     Types: Cocaine, Methamphetamines    Sexual activity: Yes     Review of Systems   Constitutional:  Positive for fatigue. Negative for activity change, appetite change and fever.   HENT:  Negative for congestion, ear discharge, mouth sores, nosebleeds, rhinorrhea, sinus pressure, sinus pain and tinnitus.    Eyes: Negative.  Negative for pain, redness and itching.   Respiratory:  Positive for shortness of breath. Negative for apnea, cough, choking, chest tightness, wheezing and stridor.    Cardiovascular:  Negative for chest pain, palpitations and leg swelling.   Gastrointestinal:  Positive for abdominal pain, constipation, nausea and vomiting. Negative for abdominal distention, anal bleeding, blood in stool and  diarrhea.   Endocrine: Negative.    Genitourinary:  Negative for difficulty urinating, flank pain, frequency and urgency.   Musculoskeletal:  Positive for myalgias. Negative for arthralgias, back pain and gait problem.   Skin:  Negative for color change and pallor.   Allergic/Immunologic: Negative.    Neurological:  Positive for weakness. Negative for dizziness, facial asymmetry, light-headedness and headaches.   Hematological:  Negative for adenopathy. Does not bruise/bleed easily.   Psychiatric/Behavioral:  The patient is nervous/anxious.    All other systems reviewed and are negative.    Objective:     Vital Signs (Most Recent):  Temp: 97.4 °F (36.3 °C) (05/27/24 1021)  Pulse: 85 (05/27/24 1645)  Resp: 16 (05/27/24 1645)  BP: 132/77 (05/27/24 1712)  SpO2: 99 % (05/27/24 1645) Vital Signs (24h Range):  Temp:  [97.4 °F (36.3 °C)] 97.4 °F (36.3 °C)  Pulse:  [] 85  Resp:  [14-23] 16  SpO2:  [94 %-99 %] 99 %  BP: (132-168)/() 132/77     Weight: 83.9 kg (185 lb)  Body mass index is 23.75 kg/m².     Physical Exam  Vitals and nursing note reviewed.   HENT:      Head: Normocephalic and atraumatic.   Eyes:      Conjunctiva/sclera: Conjunctivae normal.      Pupils: Pupils are equal, round, and reactive to light.   Cardiovascular:      Rate and Rhythm: Normal rate and regular rhythm.      Heart sounds: Normal heart sounds.   Pulmonary:      Effort: Respiratory distress present.      Breath sounds: Wheezing and rales present.   Abdominal:      General: Bowel sounds are normal. There is distension.      Palpations: Abdomen is soft. There is mass.      Tenderness: There is abdominal tenderness.   Musculoskeletal:         General: No tenderness or deformity. Normal range of motion.      Cervical back: Normal range of motion and neck supple.   Skin:     General: Skin is warm and dry.      Capillary Refill: Capillary refill takes less than 2 seconds.   Neurological:      Mental Status: He is alert and oriented to  person, place, and time.              CRANIAL NERVES     CN III, IV, VI   Pupils are equal, round, and reactive to light.       Significant Labs: All pertinent labs within the past 24 hours have been reviewed.  Urine Studies:   Recent Labs   Lab 05/27/24  1120   COLORU Yellow   APPEARANCEUA Clear   PHUR >8.0*   SPECGRAV 1.015   PROTEINUA Negative   GLUCUA Negative   KETONESU 1+*   BILIRUBINUA Negative   OCCULTUA Trace*   NITRITE Negative   UROBILINOGEN Negative   LEUKOCYTESUR Negative       Significant Imaging: I have reviewed all pertinent imaging results/findings within the past 24 hours.  U/S: I have reviewed all pertinent results/findings within the past 24 hours and my personal findings are:

## 2024-05-27 NOTE — HPI
Chief Complaint   Patient presents with    Abdominal Pain       RUQ pain      EMS to ED alone. Patient complains of epigastric pain with vomiting onset 05/24/2024. Treated at the ED 05/25, 05/26, 05/27. Denies chest pain. Complains of shortness of breath. Denies fever or diarrhea. Decreased PO. Denies URI. Denies swelling. History of CHF with EF of approximately 20% per echo report dated 01/26/2024. Hx of meth use, last use per patient was about 1 week ago. At arrival, patient is uncomfortable, short of breath, moaning in exam bed. Per EMS, given aspirin 324 and Ntg X 4 doses PTA. Denies tobacco use.  @1240, states has Meth use was 05/23/2024. Last PO was 05/25/2024, he has not been able to eat or drink due to pain and vomiting.  Patient with a past medical history of congestive heart failure, hepatitis-C, illicit drug use, hyperlipidemia, and COPD.  Patient states that he has never seen a cardiologist but does see a primary care physician in [facility].  Patient has been placed on Lasix due to congestive heart failure.  Previous echo reveals 15-20% ejection fraction, moderate mitral regurgitation, in global hypokinesis.           The history is provided by the patient and the EMS personnel.           Review of patient's allergies indicates:   Allergen Reactions    Penicillins Swelling    Codeine Nausea Only       Left Ventricle: The left ventricle is moderately dilated. Moderately increased wall thickness. Severe global hypokinesis present. There is severely reduced systolic function with a visually estimated ejection fraction of 15 - 20%. Ejection fraction by visual approximation is 20%. Global longitudinal strain is -4%. Severly reduced. There is diastolic dysfunction.    Right Ventricle: Normal right ventricular cavity size. Systolic function is severely reduced. TAPSE is 1.40 cm.    Left Atrium: Left atrium is moderately dilated.    Right Atrium: Right atrium is mildly dilated.    Aortic Valve: The  aortic valve is a trileaflet valve.    Mitral Valve: There is moderate to severe regurgitation.    IVC/SVC: Normal venous pressure at 3 mmHg.    Pericardium: There is a small effusion. No indication of cardiac tamponade.

## 2024-05-27 NOTE — ASSESSMENT & PLAN NOTE
General surgery consulted while in the emergency department.  I will be admitting this patient to the hospitalist service  Begin empiric antibiotics of ciprofloxacin 400 mg IV b.i.d. and metronidazole 500 mg q.8 hours.  Continue pain control as needed  Consult cardiology for help in evaluating this patient for surgical risk.  Preoperative evaluation for surgical risk.  It appears based on preliminary information that this patient will be a moderate to high risk candidate for any elective surgery due to his cardiomyopathy which has never been evaluated.

## 2024-05-27 NOTE — H&P
[facility] Medicine  History & Physical    Patient Name: Yeison Manuel  MRN: [id number]  Patient Class: IP- Inpatient  Admission Date: 5/27/2024  Attending Physician: Teofilo Gray MD  Primary Care Provider: [facility]         Patient information was obtained from patient and ER records.     Subjective:     Principal Problem:Acute cholecystitis    Chief Complaint:   Chief Complaint   Patient presents with    Abdominal Pain     RUQ pain        HPI:        Chief Complaint   Patient presents with    Abdominal Pain       RUQ pain      EMS to ED alone. Patient complains of epigastric pain with vomiting onset 05/24/2024. Treated at the ED 05/25, 05/26, 05/27. Denies chest pain. Complains of shortness of breath. Denies fever or diarrhea. Decreased PO. Denies URI. Denies swelling. History of CHF with EF of approximately 20% per echo report dated 01/26/2024. Hx of meth use, last use per patient was about 1 week ago. At arrival, patient is uncomfortable, short of breath, moaning in exam bed. Per EMS, given aspirin 324 and Ntg X 4 doses PTA. Denies tobacco use.  @1240, states has Meth use was 05/23/2024. Last PO was 05/25/2024, he has not been able to eat or drink due to pain and vomiting.  Patient with a past medical history of congestive heart failure, hepatitis-C, illicit drug use, hyperlipidemia, and COPD.  Patient states that he has never seen a cardiologist but does see a primary care physician in [facility].  Patient has been placed on Lasix due to congestive heart failure.  Previous echo reveals 15-20% ejection fraction, moderate mitral regurgitation, in global hypokinesis.           The history is provided by the patient and the EMS personnel.           Review of patient's allergies indicates:   Allergen Reactions    Penicillins Swelling    Codeine Nausea Only       Left Ventricle: The left ventricle is moderately dilated. Moderately increased wall thickness. Severe global hypokinesis  present. There is severely reduced systolic function with a visually estimated ejection fraction of 15 - 20%. Ejection fraction by visual approximation is 20%. Global longitudinal strain is -4%. Severly reduced. There is diastolic dysfunction.    Right Ventricle: Normal right ventricular cavity size. Systolic function is severely reduced. TAPSE is 1.40 cm.    Left Atrium: Left atrium is moderately dilated.    Right Atrium: Right atrium is mildly dilated.    Aortic Valve: The aortic valve is a trileaflet valve.    Mitral Valve: There is moderate to severe regurgitation.    IVC/SVC: Normal venous pressure at 3 mmHg.    Pericardium: There is a small effusion. No indication of cardiac tamponade.    Past Medical History:   Diagnosis Date    Renal disorder        No past surgical history on file.    Review of patient's allergies indicates:   Allergen Reactions    Penicillins Swelling    Codeine Nausea Only       No current facility-administered medications on file prior to encounter.     Current Outpatient Medications on File Prior to Encounter   Medication Sig    aspirin 81 MG Chew Take 1 tablet (81 mg total) by mouth once daily.    carvediloL (COREG) 3.125 MG tablet Take 1 tablet (3.125 mg total) by mouth 2 (two) times daily.    furosemide (LASIX) 40 MG tablet Take 1 tablet (40 mg total) by mouth once daily.    HYDROcodone-acetaminophen (NORCO)  mg per tablet Take 1 tablet by mouth every 8 (eight) hours as needed for Pain.    lisinopriL (PRINIVIL,ZESTRIL) 2.5 MG tablet Take 1 tablet (2.5 mg total) by mouth once daily.    sucralfate (CARAFATE) 1 gram tablet Take 1 tablet (1 g total) by mouth 4 (four) times daily. for 10 days    tamsulosin (FLOMAX) 0.4 mg Cap Take 1 capsule (0.4 mg total) by mouth once daily.     Family History    None       Tobacco Use    Smoking status: Every Day    Smokeless tobacco: Never   Substance and Sexual Activity    Alcohol use: Not Currently    Drug use: Not Currently     Types: Cocaine,  Methamphetamines    Sexual activity: Yes     Review of Systems   Constitutional:  Positive for fatigue. Negative for activity change, appetite change and fever.   HENT:  Negative for congestion, ear discharge, mouth sores, nosebleeds, rhinorrhea, sinus pressure, sinus pain and tinnitus.    Eyes: Negative.  Negative for pain, redness and itching.   Respiratory:  Positive for shortness of breath. Negative for apnea, cough, choking, chest tightness, wheezing and stridor.    Cardiovascular:  Negative for chest pain, palpitations and leg swelling.   Gastrointestinal:  Positive for abdominal pain, constipation, nausea and vomiting. Negative for abdominal distention, anal bleeding, blood in stool and diarrhea.   Endocrine: Negative.    Genitourinary:  Negative for difficulty urinating, flank pain, frequency and urgency.   Musculoskeletal:  Positive for myalgias. Negative for arthralgias, back pain and gait problem.   Skin:  Negative for color change and pallor.   Allergic/Immunologic: Negative.    Neurological:  Positive for weakness. Negative for dizziness, facial asymmetry, light-headedness and headaches.   Hematological:  Negative for adenopathy. Does not bruise/bleed easily.   Psychiatric/Behavioral:  The patient is nervous/anxious.    All other systems reviewed and are negative.    Objective:     Vital Signs (Most Recent):  Temp: 97.4 °F (36.3 °C) (05/27/24 1021)  Pulse: 85 (05/27/24 1645)  Resp: 16 (05/27/24 1645)  BP: 132/77 (05/27/24 1712)  SpO2: 99 % (05/27/24 1645) Vital Signs (24h Range):  Temp:  [97.4 °F (36.3 °C)] 97.4 °F (36.3 °C)  Pulse:  [] 85  Resp:  [14-23] 16  SpO2:  [94 %-99 %] 99 %  BP: (132-168)/() 132/77     Weight: 83.9 kg (185 lb)  Body mass index is 23.75 kg/m².     Physical Exam  Vitals and nursing note reviewed.   HENT:      Head: Normocephalic and atraumatic.   Eyes:      Conjunctiva/sclera: Conjunctivae normal.      Pupils: Pupils are equal, round, and reactive to light.    Cardiovascular:      Rate and Rhythm: Normal rate and regular rhythm.      Heart sounds: Normal heart sounds.   Pulmonary:      Effort: Respiratory distress present.      Breath sounds: Wheezing and rales present.   Abdominal:      General: Bowel sounds are normal. There is distension.      Palpations: Abdomen is soft. There is mass.      Tenderness: There is abdominal tenderness.   Musculoskeletal:         General: No tenderness or deformity. Normal range of motion.      Cervical back: Normal range of motion and neck supple.   Skin:     General: Skin is warm and dry.      Capillary Refill: Capillary refill takes less than 2 seconds.   Neurological:      Mental Status: He is alert and oriented to person, place, and time.              CRANIAL NERVES     CN III, IV, VI   Pupils are equal, round, and reactive to light.       Significant Labs: All pertinent labs within the past 24 hours have been reviewed.  Urine Studies:   Recent Labs   Lab 05/27/24  1120   COLORU Yellow   APPEARANCEUA Clear   PHUR >8.0*   SPECGRAV 1.015   PROTEINUA Negative   GLUCUA Negative   KETONESU 1+*   BILIRUBINUA Negative   OCCULTUA Trace*   NITRITE Negative   UROBILINOGEN Negative   LEUKOCYTESUR Negative       Significant Imaging: I have reviewed all pertinent imaging results/findings within the past 24 hours.  U/S: I have reviewed all pertinent results/findings within the past 24 hours and my personal findings are:     Assessment/Plan:     * Acute cholecystitis  General surgery consulted while in the emergency department.  I will be admitting this patient to the hospitalist service  Begin empiric antibiotics of ciprofloxacin 400 mg IV b.i.d. and metronidazole 500 mg q.8 hours.  Continue pain control as needed  Consult cardiology for help in evaluating this patient for surgical risk.  Preoperative evaluation for surgical risk.  It appears based on preliminary information that this patient will be a moderate to high risk candidate for any  elective surgery due to his cardiomyopathy which has never been evaluated.    Combined systolic and diastolic congestive heart failure  Patient is identified as having Combined Systolic and Diastolic heart failure that is Chronic. CHF is currently controlled. Latest ECHO performed and demonstrates- Results for orders placed during the hospital encounter of 01/25/24    Echo    Interpretation Summary    Left Ventricle: The left ventricle is moderately dilated. Moderately increased wall thickness. Severe global hypokinesis present. There is severely reduced systolic function with a visually estimated ejection fraction of 15 - 20%. Ejection fraction by visual approximation is 20%. Global longitudinal strain is -4%. Severly reduced. There is diastolic dysfunction.    Right Ventricle: Normal right ventricular cavity size. Systolic function is severely reduced. TAPSE is 1.40 cm.    Left Atrium: Left atrium is moderately dilated.    Right Atrium: Right atrium is mildly dilated.    Aortic Valve: The aortic valve is a trileaflet valve.    Mitral Valve: There is moderate to severe regurgitation.    IVC/SVC: Normal venous pressure at 3 mmHg.    Pericardium: There is a small effusion. No indication of cardiac tamponade.  . Continue Furosemide and monitor clinical status closely. Monitor on telemetry. Patient is off CHF pathway.  Monitor strict Is&Os and daily weights.  Place on fluid restriction of 1.5 L. Cardiology has been consulted. Continue to stress to patient importance of self efficacy and  on diet for CHF. Last BNP reviewed- and noted below   Recent Labs   Lab 05/27/24  1027   *       COPD (chronic obstructive pulmonary disease)  Patient's COPD is controlled currently.  Patient is currently off COPD Pathway. Continue scheduled inhalers Steroids, Antibiotics, and Supplemental oxygen and monitor respiratory status closely.     Hypertension  Chronic, controlled. Latest blood pressure and vitals reviewed-      Temp:  [97.4 °F (36.3 °C)]   Pulse:  []   Resp:  [14-23]   BP: (132-168)/()   SpO2:  [94 %-99 %] .   Home meds for hypertension were reviewed and noted below.   Hypertension Medications               carvediloL (COREG) 3.125 MG tablet Take 1 tablet (3.125 mg total) by mouth 2 (two) times daily.    furosemide (LASIX) 40 MG tablet Take 1 tablet (40 mg total) by mouth once daily.    lisinopriL (PRINIVIL,ZESTRIL) 2.5 MG tablet Take 1 tablet (2.5 mg total) by mouth once daily.            While in the hospital, will manage blood pressure as follows; Continue home antihypertensive regimen    Will utilize p.r.n. blood pressure medication only if patient's blood pressure greater than 180/110 and he develops symptoms such as worsening chest pain or shortness of breath.      VTE Risk Mitigation (From admission, onward)           Ordered     heparin (porcine) injection 5,000 Units  Every 8 hours         05/27/24 1713     IP VTE HIGH RISK PATIENT  Once         05/27/24 1713     Place sequential compression device  Until discontinued         05/27/24 1713                                    Teofilo Gray MD  Department of Hospital Medicine  [facility]

## 2024-05-28 PROBLEM — I42.7 CARDIOMYOPATHY SECONDARY TO DRUG: Status: ACTIVE | Noted: 2024-05-28

## 2024-05-28 LAB
ALBUMIN SERPL BCP-MCNC: 3.4 G/DL (ref 3.5–5.2)
ALP SERPL-CCNC: 64 U/L (ref 55–135)
ALT SERPL W/O P-5'-P-CCNC: 15 U/L (ref 10–44)
ANION GAP SERPL CALC-SCNC: 9 MMOL/L (ref 8–16)
AST SERPL-CCNC: 20 U/L (ref 10–40)
BILIRUB SERPL-MCNC: 1.1 MG/DL (ref 0.1–1)
BUN SERPL-MCNC: 13 MG/DL (ref 6–20)
CALCIUM SERPL-MCNC: 9 MG/DL (ref 8.7–10.5)
CHLORIDE SERPL-SCNC: 104 MMOL/L (ref 95–110)
CHOLEST SERPL-MCNC: 149 MG/DL (ref 120–199)
CHOLEST SERPL-MCNC: 159 MG/DL (ref 120–199)
CHOLEST/HDLC SERPL: 3.6 {RATIO} (ref 2–5)
CHOLEST/HDLC SERPL: 3.8 {RATIO} (ref 2–5)
CO2 SERPL-SCNC: 22 MMOL/L (ref 23–29)
CREAT SERPL-MCNC: 0.9 MG/DL (ref 0.5–1.4)
EST. GFR  (NO RACE VARIABLE): >60 ML/MIN/1.73 M^2
GLUCOSE SERPL-MCNC: 93 MG/DL (ref 70–110)
HDLC SERPL-MCNC: 39 MG/DL (ref 40–75)
HDLC SERPL-MCNC: 44 MG/DL (ref 40–75)
HDLC SERPL: 26.2 % (ref 20–50)
HDLC SERPL: 27.7 % (ref 20–50)
LDLC SERPL CALC-MCNC: 95.8 MG/DL (ref 63–159)
LDLC SERPL CALC-MCNC: 98.4 MG/DL (ref 63–159)
MAGNESIUM SERPL-MCNC: 1.8 MG/DL (ref 1.6–2.6)
NONHDLC SERPL-MCNC: 110 MG/DL
NONHDLC SERPL-MCNC: 115 MG/DL
OHS QRS DURATION: 136 MS
OHS QTC CALCULATION: 518 MS
PHOSPHATE SERPL-MCNC: 2.7 MG/DL (ref 2.7–4.5)
POTASSIUM SERPL-SCNC: 4.2 MMOL/L (ref 3.5–5.1)
PROT SERPL-MCNC: 6.4 G/DL (ref 6–8.4)
SODIUM SERPL-SCNC: 135 MMOL/L (ref 136–145)
TRIGL SERPL-MCNC: 71 MG/DL (ref 30–150)
TRIGL SERPL-MCNC: 83 MG/DL (ref 30–150)
TROPONIN I SERPL DL<=0.01 NG/ML-MCNC: 0.09 NG/ML (ref 0–0.03)

## 2024-05-28 PROCEDURE — 80053 COMPREHEN METABOLIC PANEL: CPT | Performed by: INTERNAL MEDICINE

## 2024-05-28 PROCEDURE — 63600175 PHARM REV CODE 636 W HCPCS: Mod: JZ,JG | Performed by: INTERNAL MEDICINE

## 2024-05-28 PROCEDURE — 80061 LIPID PANEL: CPT | Mod: 91 | Performed by: INTERNAL MEDICINE

## 2024-05-28 PROCEDURE — 25000003 PHARM REV CODE 250: Performed by: INTERNAL MEDICINE

## 2024-05-28 PROCEDURE — 83735 ASSAY OF MAGNESIUM: CPT | Performed by: INTERNAL MEDICINE

## 2024-05-28 PROCEDURE — 84100 ASSAY OF PHOSPHORUS: CPT | Performed by: INTERNAL MEDICINE

## 2024-05-28 PROCEDURE — 11000001 HC ACUTE MED/SURG PRIVATE ROOM

## 2024-05-28 PROCEDURE — 97161 PT EVAL LOW COMPLEX 20 MIN: CPT

## 2024-05-28 PROCEDURE — 80061 LIPID PANEL: CPT | Performed by: INTERNAL MEDICINE

## 2024-05-28 PROCEDURE — 94761 N-INVAS EAR/PLS OXIMETRY MLT: CPT

## 2024-05-28 PROCEDURE — 36415 COLL VENOUS BLD VENIPUNCTURE: CPT | Performed by: INTERNAL MEDICINE

## 2024-05-28 PROCEDURE — 99233 SBSQ HOSP IP/OBS HIGH 50: CPT | Mod: ,,, | Performed by: INTERNAL MEDICINE

## 2024-05-28 PROCEDURE — 99231 SBSQ HOSP IP/OBS SF/LOW 25: CPT | Mod: ,,, | Performed by: SPECIALIST

## 2024-05-28 PROCEDURE — 99900035 HC TECH TIME PER 15 MIN (STAT)

## 2024-05-28 RX ADMIN — HEPARIN SODIUM 5000 UNITS: 5000 INJECTION INTRAVENOUS; SUBCUTANEOUS at 09:05

## 2024-05-28 RX ADMIN — CARVEDILOL 3.12 MG: 3.12 TABLET, FILM COATED ORAL at 09:05

## 2024-05-28 RX ADMIN — TAMSULOSIN HYDROCHLORIDE 0.4 MG: 0.4 CAPSULE ORAL at 09:05

## 2024-05-28 RX ADMIN — SUCRALFATE 1 G: 1 TABLET ORAL at 09:05

## 2024-05-28 RX ADMIN — HEPARIN SODIUM 5000 UNITS: 5000 INJECTION INTRAVENOUS; SUBCUTANEOUS at 03:05

## 2024-05-28 RX ADMIN — ASPIRIN 81 MG: 81 TABLET, CHEWABLE ORAL at 09:05

## 2024-05-28 RX ADMIN — LISINOPRIL 2.5 MG: 2.5 TABLET ORAL at 09:05

## 2024-05-28 RX ADMIN — SUCRALFATE 1 G: 1 TABLET ORAL at 05:05

## 2024-05-28 RX ADMIN — MORPHINE SULFATE 2 MG: 2 INJECTION, SOLUTION INTRAMUSCULAR; INTRAVENOUS at 06:05

## 2024-05-28 RX ADMIN — METRONIDAZOLE 500 MG: 500 INJECTION, SOLUTION INTRAVENOUS at 01:05

## 2024-05-28 RX ADMIN — SUCRALFATE 1 G: 1 TABLET ORAL at 03:05

## 2024-05-28 RX ADMIN — CIPROFLOXACIN 400 MG: 400 INJECTION, SOLUTION INTRAVENOUS at 05:05

## 2024-05-28 RX ADMIN — METRONIDAZOLE 500 MG: 500 INJECTION, SOLUTION INTRAVENOUS at 05:05

## 2024-05-28 RX ADMIN — METRONIDAZOLE 500 MG: 500 INJECTION, SOLUTION INTRAVENOUS at 09:05

## 2024-05-28 NOTE — PROGRESS NOTES
[facility] Medicine  Progress Note    Patient Name: Yeison Manuel  MRN: [id number]  Patient Class: IP- Inpatient   Admission Date: 5/27/2024  Length of Stay: 1 days  Attending Physician: Teofilo Gray MD  Primary Care Provider: Marge Piper FNP        Subjective:     Principal Problem:Acute cholecystitis        HPI:       Chief Complaint   Patient presents with    Abdominal Pain       RUQ pain      EMS to ED alone. Patient complains of epigastric pain with vomiting onset 05/24/2024. Treated at the ED 05/25, 05/26, 05/27. Denies chest pain. Complains of shortness of breath. Denies fever or diarrhea. Decreased PO. Denies URI. Denies swelling. History of CHF with EF of approximately 20% per echo report dated 01/26/2024. Hx of meth use, last use per patient was about 1 week ago. At arrival, patient is uncomfortable, short of breath, moaning in exam bed. Per EMS, given aspirin 324 and Ntg X 4 doses PTA. Denies tobacco use.  @1240, states has Meth use was 05/23/2024. Last PO was 05/25/2024, he has not been able to eat or drink due to pain and vomiting.  Patient with a past medical history of congestive heart failure, hepatitis-C, illicit drug use, hyperlipidemia, and COPD.  Patient states that he has never seen a cardiologist but does see a primary care physician in [facility] [place].  Patient has been placed on Lasix due to congestive heart failure.  Previous echo reveals 15-20% ejection fraction, moderate mitral regurgitation, in global hypokinesis.           The history is provided by the patient and the EMS personnel.           Review of patient's allergies indicates:   Allergen Reactions    Penicillins Swelling    Codeine Nausea Only       Left Ventricle: The left ventricle is moderately dilated. Moderately increased wall thickness. Severe global hypokinesis present. There is severely reduced systolic function with a visually estimated ejection fraction of 15 - 20%. Ejection fraction by visual  approximation is 20%. Global longitudinal strain is -4%. Severly reduced. There is diastolic dysfunction.    Right Ventricle: Normal right ventricular cavity size. Systolic function is severely reduced. TAPSE is 1.40 cm.    Left Atrium: Left atrium is moderately dilated.    Right Atrium: Right atrium is mildly dilated.    Aortic Valve: The aortic valve is a trileaflet valve.    Mitral Valve: There is moderate to severe regurgitation.    IVC/SVC: Normal venous pressure at 3 mmHg.    Pericardium: There is a small effusion. No indication of cardiac tamponade.    Overview/Hospital Course:  No notes on file    Interval History:  Patient admitted for acute cholecystitis.  Echocardiogram yesterday in preop evaluation revealed a severe cardiomyopathy with ejection fraction 15% or less.  Patient has seen a cardiologist proximally 2 years ago but did not follow up with a stress test he wanted to complete.  The patient has never had a catheterization.  Echo in January of this year appeared similar to yesterday.  Therefore this patient would be a high risk for any elective surgery at this point.  Patient has a history of methamphetamine abuse.  Laboratory exams revealed a normal lipid panel with troponin 0.089.  In previous was 0.104.  Magnesium and phosphorus are normal in urinalysis was negative.  Urine drug screen was positive for amphetamines.  Cardiology did see this patient today and , cardiology consult stated that he would recommend transfer of this patient for higher level of care based on the risk of surgery.    Review of Systems   Constitutional:  Positive for fatigue. Negative for activity change, appetite change and fever.   HENT:  Negative for congestion, ear discharge, mouth sores, nosebleeds, rhinorrhea, sinus pressure, sinus pain and tinnitus.    Eyes: Negative.  Negative for pain, redness and itching.   Respiratory:  Positive for shortness of breath. Negative for apnea, cough, choking, chest tightness,  wheezing and stridor.    Cardiovascular:  Negative for chest pain, palpitations and leg swelling.   Gastrointestinal:  Positive for abdominal pain, constipation, nausea and vomiting. Negative for abdominal distention, anal bleeding, blood in stool and diarrhea.   Endocrine: Negative.    Genitourinary:  Negative for difficulty urinating, flank pain, frequency and urgency.   Musculoskeletal:  Positive for myalgias. Negative for arthralgias, back pain and gait problem.   Skin:  Negative for color change and pallor.   Allergic/Immunologic: Negative.    Neurological:  Positive for weakness. Negative for dizziness, facial asymmetry, light-headedness and headaches.   Hematological:  Negative for adenopathy. Does not bruise/bleed easily.   Psychiatric/Behavioral:  The patient is nervous/anxious.    All other systems reviewed and are negative.    Objective:     Vital Signs (Most Recent):  Temp: 97.9 °F (36.6 °C) (05/28/24 0714)  Pulse: 90 (05/28/24 0934)  Resp: 18 (05/28/24 0714)  BP: 114/79 (05/28/24 0934)  SpO2: 98 % (05/28/24 1100) Vital Signs (24h Range):  Temp:  [97.4 °F (36.3 °C)-98.4 °F (36.9 °C)] 97.9 °F (36.6 °C)  Pulse:  [] 90  Resp:  [16-19] 18  SpO2:  [94 %-99 %] 98 %  BP: (109-132)/(76-89) 114/79     Weight: 87.6 kg (193 lb 2 oz)  Body mass index is 24.8 kg/m².    Intake/Output Summary (Last 24 hours) at 5/28/2024 1500  Last data filed at 5/28/2024 0600  Gross per 24 hour   Intake 300 ml   Output 950 ml   Net -650 ml         Physical Exam  Vitals and nursing note reviewed.   HENT:      Head: Normocephalic and atraumatic.   Eyes:      Conjunctiva/sclera: Conjunctivae normal.      Pupils: Pupils are equal, round, and reactive to light.   Cardiovascular:      Rate and Rhythm: Normal rate and regular rhythm.      Heart sounds: Normal heart sounds.   Pulmonary:      Effort: Respiratory distress present.      Breath sounds: Wheezing and rales present.   Abdominal:      General: Bowel sounds are normal. There is  distension.      Palpations: Abdomen is soft. There is mass.      Tenderness: There is abdominal tenderness.   Musculoskeletal:         General: No tenderness or deformity. Normal range of motion.      Cervical back: Normal range of motion and neck supple.   Skin:     General: Skin is warm and dry.      Capillary Refill: Capillary refill takes less than 2 seconds.   Neurological:      Mental Status: He is alert and oriented to person, place, and time.             Significant Labs: All pertinent labs within the past 24 hours have been reviewed.  Recent Lab Results         05/28/24  0514   05/27/24  2253   05/27/24 2013 05/27/24  1730        Albumin 3.4             ALP 64             ALT 15             Anion Gap 9             Appearance, UA     Hazy         AST 20             Bilirubin (UA)     Negative         BILIRUBIN TOTAL 1.1  Comment: For infants and newborns, interpretation of results should be based  on gestational age, weight and in agreement with clinical  observations.    Premature Infant recommended reference ranges:  Up to 24 hours.............<8.0 mg/dL  Up to 48 hours............<12.0 mg/dL  3-5 days..................<15.0 mg/dL  6-29 days.................<15.0 mg/dL               BUN 13             Calcium 9.0             Chloride 104             Cholesterol Total 159  Comment: The National Cholesterol Education Program (NCEP) has set the  following guidelines (reference ranges) for Cholesterol:  Optimal.....................<200 mg/dL  Borderline High.............200-239 mg/dL  High........................> or = 240 mg/dL               CO2 22             Color, UA     Yellow         Creatinine 0.9             eGFR >60.0             Glucose 93             Glucose, UA     Negative         HDL 44  Comment: The National Cholesterol Education Program (NCEP) has set the  following guidelines (reference values) for HDL Cholesterol:  Low...............<40 mg/dL  Optimal...........>60 mg/dL                HDL/Cholesterol Ratio 27.7             Ketones, UA     Trace         LDL Cholesterol 98.4  Comment: The National Cholesterol Education Program (NCEP) has set the  following guidelines (reference values) for LDL Cholesterol:  Optimal.......................<130 mg/dL  Borderline High...............130-159 mg/dL  High..........................160-189 mg/dL  Very High.....................>190 mg/dL               Leukocyte Esterase, UA     Negative         Magnesium  1.8             NITRITE UA     Negative         Non-HDL Cholesterol 115  Comment: Risk category and Non-HDL cholesterol goals:  Coronary heart disease (CHD)or equivalent (10-year risk of CHD >20%):  Non-HDL cholesterol goal     <130 mg/dL  Two or more CHD risk factors and 10-year risk of CHD <= 20%:  Non-HDL cholesterol goal     <160 mg/dL  0 to 1 CHD risk factor:  Non-HDL cholesterol goal     <190 mg/dL               Blood, UA     Trace         pH, UA     >8.0         Phosphorus Level 2.7             Potassium 4.2             PROTEIN TOTAL 6.4             Protein, UA     Negative  Comment: Recommend a 24 hour urine protein or a urine   protein/creatinine ratio if globulin induced proteinuria is  clinically suspected.           Sodium 135             Specific Gravity, UA     >=1.030         Specimen UA     Urine, Clean Catch         Total Cholesterol/HDL Ratio 3.6             Triglycerides 83  Comment: The National Cholesterol Education Program (NCEP) has set the  following guidelines (reference values) for triglycerides:  Normal......................<150 mg/dL  Borderline High.............150-199 mg/dL  High........................200-499 mg/dL               Troponin I   0.089  Comment: The reference interval for Troponin I represents the 99th percentile   cutoff   for our facility and is consistent with 3rd generation assay   performance.       0.104  Comment: The reference interval for Troponin I represents the 99th percentile   cutoff   for our facility  and is consistent with 3rd generation assay   performance.         TSH       3.134       UROBILINOGEN UA     Negative                 Significant Imaging: I have reviewed all pertinent imaging results/findings within the past 24 hours.    Assessment/Plan:      * Acute cholecystitis  General surgery consulted while in the emergency department.  I will be admitting this patient to the hospitalist service  Begin empiric antibiotics of ciprofloxacin 400 mg IV b.i.d. and metronidazole 500 mg q.8 hours.  Continue pain control as needed  Consult cardiology for help in evaluating this patient for surgical risk.  Preoperative evaluation for surgical risk.  It appears based on preliminary information that this patient will be a moderate to high risk candidate for any elective surgery due to his cardiomyopathy which has never been evaluated.    Patient is a high risk for any elective surgical procedure.  Attempt transfer for Cardiology evaluation to better risk stratify this patient for surgery that may be needed in the future  Continue current antibiotics and pain control.    Cardiomyopathy secondary to drug  Patient with severe global hypokinesis and ejection fraction 15% or less.  Patient is a high risk for perioperative cardiac complications for surgery.  Consulted cardiology:  Recommended transfer for further evaluation.      Combined systolic and diastolic congestive heart failure  Patient is identified as having Combined Systolic and Diastolic heart failure that is Chronic. CHF is currently controlled. Latest ECHO performed and demonstrates- Results for orders placed during the hospital encounter of 01/25/24    Echo    Interpretation Summary    Left Ventricle: The left ventricle is moderately dilated. Moderately increased wall thickness. Severe global hypokinesis present. There is severely reduced systolic function with a visually estimated ejection fraction of 15 - 20%. Ejection fraction by visual approximation is 20%.  Global longitudinal strain is -4%. Severly reduced. There is diastolic dysfunction.    Right Ventricle: Normal right ventricular cavity size. Systolic function is severely reduced. TAPSE is 1.40 cm.    Left Atrium: Left atrium is moderately dilated.    Right Atrium: Right atrium is mildly dilated.    Aortic Valve: The aortic valve is a trileaflet valve.    Mitral Valve: There is moderate to severe regurgitation.    IVC/SVC: Normal venous pressure at 3 mmHg.    Pericardium: There is a small effusion. No indication of cardiac tamponade.  . Continue Furosemide and monitor clinical status closely. Monitor on telemetry. Patient is off CHF pathway.  Monitor strict Is&Os and daily weights.  Place on fluid restriction of 1.5 L. Cardiology has been consulted. Continue to stress to patient importance of self efficacy and  on diet for CHF. Last BNP reviewed- and noted below   Recent Labs   Lab 05/27/24  1027   *       COPD (chronic obstructive pulmonary disease)  Patient's COPD is controlled currently.  Patient is currently off COPD Pathway. Continue scheduled inhalers Steroids, Antibiotics, and Supplemental oxygen and monitor respiratory status closely.     Hypertension  Chronic, controlled. Latest blood pressure and vitals reviewed-     Temp:  [97.4 °F (36.3 °C)-97.7 °F (36.5 °C)]   Pulse:  []   Resp:  [14-23]   BP: (130-168)/()   SpO2:  [94 %-99 %] .   Home meds for hypertension were reviewed and noted below.   Hypertension Medications               carvediloL (COREG) 3.125 MG tablet Take 1 tablet (3.125 mg total) by mouth 2 (two) times daily.    furosemide (LASIX) 40 MG tablet Take 1 tablet (40 mg total) by mouth once daily.    lisinopriL (PRINIVIL,ZESTRIL) 2.5 MG tablet Take 1 tablet (2.5 mg total) by mouth once daily.            While in the hospital, will manage blood pressure as follows; Continue home antihypertensive regimen    Will utilize p.r.n. blood pressure medication only if patient's  blood pressure greater than 180/110 and he develops symptoms such as worsening chest pain or shortness of breath.      VTE Risk Mitigation (From admission, onward)           Ordered     heparin (porcine) injection 5,000 Units  Every 8 hours         05/27/24 1713     IP VTE HIGH RISK PATIENT  Once         05/27/24 1713     Place sequential compression device  Until discontinued         05/27/24 1713                    Discharge Planning   MARITO: 5/30/2024     Code Status: Full Code   Is the patient medically ready for discharge?:     Reason for patient still in hospital (select all that apply): Treatment  Discharge Plan A: Home with family                  Teofilo Gray MD  Department of Hospital Medicine   [facility]

## 2024-05-28 NOTE — PT/OT/SLP EVAL
"Physical Therapy Evaluation and Discharge Note    Patient Name:  Yeison Manuel   MRN:  [id number]    Recommendations:     Discharge Recommendations: Home with family   Discharge Equipment Recommendations: none    Barriers to discharge: None    Assessment:     Yeison Manuel is a 53 y.o. male admitted with a medical diagnosis of Acute cholecystitis. .  At this time, patient is functioning at their prior level of function and does not require further acute PT services.     Recent Surgery: Procedure(s) (LRB):  CHOLECYSTECTOMY, LAPAROSCOPIC, WITH CHOLANGIOGRAM (N/A)      Plan:     During this hospitalization, patient does not require further acute PT services.  Please re-consult if situation changes.      Subjective     Chief Complaint: acute cholecystitis  Patient/Family Comments/goals: "to see a cardiologist in order to be cleared to have my gall bladder removed"  Pain/Comfort:   None reported    Patients cultural, spiritual, Spiritism conflicts given the current situation:      Living Environment:  Lives with family in a single level home with no steps to enter.  Prior to admission, patients level of function was independent.  Equipment used at home: none .  DME owned (not currently used): none.  Upon discharge, patient will have assistance from family.    Objective:     Communicated with nursing prior to session.  Patient found supine with   upon PT entry to room.    General Precautions: Standard,      Orthopedic Precautions:  none  Braces:  none  Respiratory Status: Room air    Exams:  RLE Strength: WNL  LLE Strength: WNL    Functional Mobility:  Bed Mobility:     Supine to Sit: independence  Sit to Supine: independence  Transfers:     Sit to Stand:  independence with no AD  Gait: Patient ambulated 5 feet independently without AD.    AM-PAC 6 CLICK MOBILITY  Total Score:        Treatment and Education:  Upon entering room, patient was found supine in bed with family present.  Performed supine to sit " independently.  Performed sit to stand independently without AD.  Patient ambulated 5 feet independently without AD (unable to ambulate further due to IV) with good balance.  Patient returned to edge of bed and performed sit to supine independently.    AM-PAC 6 CLICK MOBILITY  Total Score:      Patient left supine with all lines intact and call button in reach.    GOALS:   Multidisciplinary Problems       Physical Therapy Goals       Not on file                    History:     Past Medical History:   Diagnosis Date    Renal disorder        No past surgical history on file.    Time Tracking:     PT Received On: 5/28/24   PT Start Time:       PT Stop Time:    PT Total Time (min):   15    Billable Minutes: Evaluation 15      05/28/2024

## 2024-05-28 NOTE — SUBJECTIVE & OBJECTIVE
Interval History:  Patient admitted for acute cholecystitis.  Echocardiogram yesterday in preop evaluation revealed a severe cardiomyopathy with ejection fraction 15% or less.  Patient has seen a cardiologist proximally 2 years ago but did not follow up with a stress test he wanted to complete.  The patient has never had a catheterization.  Echo in January of this year appeared similar to yesterday.  Therefore this patient would be a high risk for any elective surgery at this point.  Patient has a history of methamphetamine abuse.  Laboratory exams revealed a normal lipid panel with troponin 0.089.  In previous was 0.104.  Magnesium and phosphorus are normal in urinalysis was negative.  Urine drug screen was positive for amphetamines.  Cardiology did see this patient today and , cardiology consult stated that he would recommend transfer of this patient for higher level of care based on the risk of surgery.    Review of Systems   Constitutional:  Positive for fatigue. Negative for activity change, appetite change and fever.   HENT:  Negative for congestion, ear discharge, mouth sores, nosebleeds, rhinorrhea, sinus pressure, sinus pain and tinnitus.    Eyes: Negative.  Negative for pain, redness and itching.   Respiratory:  Positive for shortness of breath. Negative for apnea, cough, choking, chest tightness, wheezing and stridor.    Cardiovascular:  Negative for chest pain, palpitations and leg swelling.   Gastrointestinal:  Positive for abdominal pain, constipation, nausea and vomiting. Negative for abdominal distention, anal bleeding, blood in stool and diarrhea.   Endocrine: Negative.    Genitourinary:  Negative for difficulty urinating, flank pain, frequency and urgency.   Musculoskeletal:  Positive for myalgias. Negative for arthralgias, back pain and gait problem.   Skin:  Negative for color change and pallor.   Allergic/Immunologic: Negative.    Neurological:  Positive for weakness. Negative for  dizziness, facial asymmetry, light-headedness and headaches.   Hematological:  Negative for adenopathy. Does not bruise/bleed easily.   Psychiatric/Behavioral:  The patient is nervous/anxious.    All other systems reviewed and are negative.    Objective:     Vital Signs (Most Recent):  Temp: 97.9 °F (36.6 °C) (05/28/24 0714)  Pulse: 90 (05/28/24 0934)  Resp: 18 (05/28/24 0714)  BP: 114/79 (05/28/24 0934)  SpO2: 98 % (05/28/24 1100) Vital Signs (24h Range):  Temp:  [97.4 °F (36.3 °C)-98.4 °F (36.9 °C)] 97.9 °F (36.6 °C)  Pulse:  [] 90  Resp:  [16-19] 18  SpO2:  [94 %-99 %] 98 %  BP: (109-132)/(76-89) 114/79     Weight: 87.6 kg (193 lb 2 oz)  Body mass index is 24.8 kg/m².    Intake/Output Summary (Last 24 hours) at 5/28/2024 1500  Last data filed at 5/28/2024 0600  Gross per 24 hour   Intake 300 ml   Output 950 ml   Net -650 ml         Physical Exam  Vitals and nursing note reviewed.   HENT:      Head: Normocephalic and atraumatic.   Eyes:      Conjunctiva/sclera: Conjunctivae normal.      Pupils: Pupils are equal, round, and reactive to light.   Cardiovascular:      Rate and Rhythm: Normal rate and regular rhythm.      Heart sounds: Normal heart sounds.   Pulmonary:      Effort: Respiratory distress present.      Breath sounds: Wheezing and rales present.   Abdominal:      General: Bowel sounds are normal. There is distension.      Palpations: Abdomen is soft. There is mass.      Tenderness: There is abdominal tenderness.   Musculoskeletal:         General: No tenderness or deformity. Normal range of motion.      Cervical back: Normal range of motion and neck supple.   Skin:     General: Skin is warm and dry.      Capillary Refill: Capillary refill takes less than 2 seconds.   Neurological:      Mental Status: He is alert and oriented to person, place, and time.             Significant Labs: All pertinent labs within the past 24 hours have been reviewed.  Recent Lab Results         05/28/24 0514    05/27/24 2253   05/27/24 2013 05/27/24  1730        Albumin 3.4             ALP 64             ALT 15             Anion Gap 9             Appearance, UA     Hazy         AST 20             Bilirubin (UA)     Negative         BILIRUBIN TOTAL 1.1  Comment: For infants and newborns, interpretation of results should be based  on gestational age, weight and in agreement with clinical  observations.    Premature Infant recommended reference ranges:  Up to 24 hours.............<8.0 mg/dL  Up to 48 hours............<12.0 mg/dL  3-5 days..................<15.0 mg/dL  6-29 days.................<15.0 mg/dL               BUN 13             Calcium 9.0             Chloride 104             Cholesterol Total 159  Comment: The National Cholesterol Education Program (NCEP) has set the  following guidelines (reference ranges) for Cholesterol:  Optimal.....................<200 mg/dL  Borderline High.............200-239 mg/dL  High........................> or = 240 mg/dL               CO2 22             Color, UA     Yellow         Creatinine 0.9             eGFR >60.0             Glucose 93             Glucose, UA     Negative         HDL 44  Comment: The National Cholesterol Education Program (NCEP) has set the  following guidelines (reference values) for HDL Cholesterol:  Low...............<40 mg/dL  Optimal...........>60 mg/dL               HDL/Cholesterol Ratio 27.7             Ketones, UA     Trace         LDL Cholesterol 98.4  Comment: The National Cholesterol Education Program (NCEP) has set the  following guidelines (reference values) for LDL Cholesterol:  Optimal.......................<130 mg/dL  Borderline High...............130-159 mg/dL  High..........................160-189 mg/dL  Very High.....................>190 mg/dL               Leukocyte Esterase, UA     Negative         Magnesium  1.8             NITRITE UA     Negative         Non-HDL Cholesterol 115  Comment: Risk category and Non-HDL cholesterol  goals:  Coronary heart disease (CHD)or equivalent (10-year risk of CHD >20%):  Non-HDL cholesterol goal     <130 mg/dL  Two or more CHD risk factors and 10-year risk of CHD <= 20%:  Non-HDL cholesterol goal     <160 mg/dL  0 to 1 CHD risk factor:  Non-HDL cholesterol goal     <190 mg/dL               Blood, UA     Trace         pH, UA     >8.0         Phosphorus Level 2.7             Potassium 4.2             PROTEIN TOTAL 6.4             Protein, UA     Negative  Comment: Recommend a 24 hour urine protein or a urine   protein/creatinine ratio if globulin induced proteinuria is  clinically suspected.           Sodium 135             Specific Gravity, UA     >=1.030         Specimen UA     Urine, Clean Catch         Total Cholesterol/HDL Ratio 3.6             Triglycerides 83  Comment: The National Cholesterol Education Program (NCEP) has set the  following guidelines (reference values) for triglycerides:  Normal......................<150 mg/dL  Borderline High.............150-199 mg/dL  High........................200-499 mg/dL               Troponin I   0.089  Comment: The reference interval for Troponin I represents the 99th percentile   cutoff   for our facility and is consistent with 3rd generation assay   performance.       0.104  Comment: The reference interval for Troponin I represents the 99th percentile   cutoff   for our facility and is consistent with 3rd generation assay   performance.         TSH       3.134       UROBILINOGEN UA     Negative                 Significant Imaging: I have reviewed all pertinent imaging results/findings within the past 24 hours.

## 2024-05-28 NOTE — ASSESSMENT & PLAN NOTE
Patient with severe global hypokinesis and ejection fraction 15% or less.  Patient is a high risk for perioperative cardiac complications for surgery.  Consulted cardiology:  Recommended transfer for further evaluation.

## 2024-05-28 NOTE — PLAN OF CARE
[facility]  Initial Discharge Assessment       Assessment completed at bedside with Pt, and all information on FaceSheet confirmed, including demographics, PCP, pharmacy and insurance. Pt has not addressed advance directives, and reports Camryn Manuel (Mother) [phone] is his NOK. He currently lives at home with his parents. His PCP was updated to Mar[facility] Piper at [facility]. His preferred pharmacy is [facility] [place] [facility]. He denies any HH/HD/Blood Thinners. Pt has a blood pressure machine. For transportation to appointments, and at discharge, mother to provide.  He denies any recent hospitalizations. Plan for discharge is to return home with family.  Case Management to continue to follow for discharge planning needs.          Primary Care Provider: No, Primary Doctor    Admission Diagnosis: Acute cholecystitis [K81.0]  Pain in the abdomen [R10.9]  Elevated troponin [R79.89]  Drug use [F19.90]  Abdominal pain [R10.9]  Chest pain [R07.9]    Admission Date: 5/27/2024  Expected Discharge Date: 5/30/2024    Transition of Care Barriers: None    Payor: [employer] / Plan: [employer] EPO / Product Type: PPO /     Extended Emergency Contact Information  Primary Emergency Contact: Camryn Manuel  Home Phone: [phone]  Mobile Phone: [phone]  Relation: Mother  Preferred language: English   needed? No  Secondary Emergency Contact: Roberto Manuel  Mobile Phone: [phone]  Relation: Father  Preferred language: English   needed? No    Discharge Plan A: Home with family  Discharge Plan B: Home with family      [facility] - [place] - [address]  [address]  [place] [address]  Phone: [phone] Fax: [phone]      Initial Assessment (most recent)       Adult Discharge Assessment - 05/28/24 1219          Discharge Assessment    Assessment Type Discharge Planning Assessment     Confirmed/corrected address, phone number and insurance Yes     Confirmed Demographics  Contacted registration to update     Source of Information patient     Reason For Admission acute cholecystitis     People in Home parent(s)     Facility Arrived From: home     Do you expect to return to your current living situation? Yes     Do you have help at home or someone to help you manage your care at home? Yes     Who are your caregiver(s) and their phone number(s)? Camryn Manuel (Mother)  [phone]     Prior to hospitilization cognitive status: Alert/Oriented     Current cognitive status: Alert/Oriented     Walking or Climbing Stairs Difficulty no     Dressing/Bathing Difficulty no     Home Layout Able to live on 1st floor     Equipment Currently Used at Home blood pressure machine     Readmission within 30 days? No     Patient currently being followed by outpatient case management? No     Do you currently have service(s) that help you manage your care at home? No     Do you take prescription medications? Yes     Do you have prescription coverage? No     Do you have any problems affording any of your prescribed medications? No     Is the patient taking medications as prescribed? yes     Who is going to help you get home at discharge? Camryn Manuel (Mother) [phone]     How do you get to doctors appointments? car, drives self;family or friend will provide     Are you on dialysis? No     Do you take coumadin? No     Discharge Plan A Home with family     Discharge Plan B Home with family     DME Needed Upon Discharge  none     Discharge Plan discussed with: Patient     Transition of Care Barriers None        Physical Activity    On average, how many days per week do you engage in moderate to strenuous exercise (like a brisk walk)? 5 days     On average, how many minutes do you engage in exercise at this level? 20 min        Financial Resource Strain    How hard is it for you to pay for the very basics like food, housing, medical care, and heating? Very hard        Housing Stability    In the last 12  months, was there a time when you were not able to pay the mortgage or rent on time? No     At any time in the past 12 months, were you homeless or living in a shelter (including now)? No        Transportation Needs    Has the lack of transportation kept you from medical appointments, meetings, work or from getting things needed for daily living? No        Food Insecurity    Within the past 12 months, you worried that your food would run out before you got the money to buy more. Never true     Within the past 12 months, the food you bought just didn't last and you didn't have money to get more. Never true        Stress    Do you feel stress - tense, restless, nervous, or anxious, or unable to sleep at night because your mind is troubled all the time - these days? Not at all        Alcohol Use    Q1: How often do you have a drink containing alcohol? Never     Q2: How many drinks containing alcohol do you have on a typical day when you are drinking? Patient does not drink     Q3: How often do you have six or more drinks on one occasion? Never

## 2024-05-28 NOTE — PROGRESS NOTES
Patient currently afebrile.  Resting in bed.  Decreased complaints of epigastric and right upper quadrant discomfort.  Echo performed on the heart yesterday reported to me by the primary care physician showing an ejection fraction of 10-15%.  Will await cardiology workup and hold surgery for now.  In the meantime patient can be set up for cholecystostomy tube should symptoms worsen.  In the meantime continue antibiotics.  Will follow.

## 2024-05-28 NOTE — ASSESSMENT & PLAN NOTE
General surgery consulted while in the emergency department.  I will be admitting this patient to the hospitalist service  Begin empiric antibiotics of ciprofloxacin 400 mg IV b.i.d. and metronidazole 500 mg q.8 hours.  Continue pain control as needed  Consult cardiology for help in evaluating this patient for surgical risk.  Preoperative evaluation for surgical risk.  It appears based on preliminary information that this patient will be a moderate to high risk candidate for any elective surgery due to his cardiomyopathy which has never been evaluated.    Patient is a high risk for any elective surgical procedure.  Attempt transfer for Cardiology evaluation to better risk stratify this patient for surgery that may be needed in the future  Continue current antibiotics and pain control.

## 2024-05-29 VITALS
WEIGHT: 193.13 LBS | SYSTOLIC BLOOD PRESSURE: 119 MMHG | OXYGEN SATURATION: 97 % | RESPIRATION RATE: 15 BRPM | DIASTOLIC BLOOD PRESSURE: 61 MMHG | TEMPERATURE: 98 F | BODY MASS INDEX: 24.78 KG/M2 | HEART RATE: 80 BPM | HEIGHT: 74 IN

## 2024-05-29 LAB
ALBUMIN SERPL BCP-MCNC: 3.6 G/DL (ref 3.5–5.2)
ALP SERPL-CCNC: 63 U/L (ref 55–135)
ALT SERPL W/O P-5'-P-CCNC: 26 U/L (ref 10–44)
ANION GAP SERPL CALC-SCNC: 11 MMOL/L (ref 8–16)
AORTIC ROOT ANNULUS: 4.08 CM
AORTIC VALVE CUSP SEPERATION: 2.26 CM
ASCENDING AORTA: 2.65 CM
AST SERPL-CCNC: 30 U/L (ref 10–40)
AV INDEX (PROSTH): 0.57
AV MEAN GRADIENT: 3 MMHG
AV PEAK GRADIENT: 5 MMHG
AV VALVE AREA BY VELOCITY RATIO: 2.67 CM²
AV VALVE AREA: 2.48 CM²
AV VELOCITY RATIO: 0.62
BILIRUB SERPL-MCNC: 0.6 MG/DL (ref 0.1–1)
BSA FOR ECHO PROCEDURE: 2.09 M2
BUN SERPL-MCNC: 12 MG/DL (ref 6–20)
CALCIUM SERPL-MCNC: 9 MG/DL (ref 8.7–10.5)
CHLORIDE SERPL-SCNC: 104 MMOL/L (ref 95–110)
CO2 SERPL-SCNC: 23 MMOL/L (ref 23–29)
CREAT SERPL-MCNC: 0.9 MG/DL (ref 0.5–1.4)
CV ECHO LV RWT: 0.47 CM
DOP CALC AO PEAK VEL: 1.07 M/S
DOP CALC AO VTI: 16.8 CM
DOP CALC LVOT AREA: 4.3 CM2
DOP CALC LVOT DIAMETER: 2.35 CM
DOP CALC LVOT PEAK VEL: 0.66 M/S
DOP CALC LVOT STROKE VOLUME: 41.62 CM3
DOP CALC MV VTI: 20.9 CM
DOP CALCLVOT PEAK VEL VTI: 9.6 CM
E WAVE DECELERATION TIME: 183.75 MSEC
E/A RATIO: 3.93
ECHO LV POSTERIOR WALL: 1.34 CM (ref 0.6–1.1)
EJECTION FRACTION: 12 %
EST. GFR  (NO RACE VARIABLE): >60 ML/MIN/1.73 M^2
FRACTIONAL SHORTENING: 5 % (ref 28–44)
GLUCOSE SERPL-MCNC: 85 MG/DL (ref 70–110)
INTERVENTRICULAR SEPTUM: 1.27 CM (ref 0.6–1.1)
IVC DIAMETER: 1.53 CM
LA MAJOR: 4.37 CM
LA MINOR: 2.44 CM
LEFT ATRIUM SIZE: 4.33 CM
LEFT ATRIUM VOLUME INDEX MOD: 9.3 ML/M2
LEFT ATRIUM VOLUME MOD: 19.5 CM3
LEFT INTERNAL DIMENSION IN SYSTOLE: 5.36 CM (ref 2.1–4)
LEFT VENTRICLE DIASTOLIC VOLUME INDEX: 75.33 ML/M2
LEFT VENTRICLE DIASTOLIC VOLUME: 158.2 ML
LEFT VENTRICLE MASS INDEX: 153 G/M2
LEFT VENTRICLE SYSTOLIC VOLUME INDEX: 66.1 ML/M2
LEFT VENTRICLE SYSTOLIC VOLUME: 138.88 ML
LEFT VENTRICULAR INTERNAL DIMENSION IN DIASTOLE: 5.67 CM (ref 3.5–6)
LEFT VENTRICULAR MASS: 321.24 G
LV SEPTAL E/E' RATIO: 16.29 M/S
LVOT MG: 0.96 MMHG
LVOT MV: 0.45 CM/S
MAGNESIUM SERPL-MCNC: 2 MG/DL (ref 1.6–2.6)
MV MEAN GRADIENT: 2 MMHG
MV PEAK A VEL: 0.29 M/S
MV PEAK E VEL: 1.14 M/S
MV PEAK GRADIENT: 6 MMHG
MV STENOSIS PRESSURE HALF TIME: 53.29 MS
MV VALVE AREA BY CONTINUITY EQUATION: 1.99 CM2
MV VALVE AREA P 1/2 METHOD: 4.13 CM2
OHS CV RV/LV RATIO: 0.55 CM
PHOSPHATE SERPL-MCNC: 2.9 MG/DL (ref 2.7–4.5)
PISA MRMAX VEL: 3.4 M/S
PISA TR MAX VEL: 1.55 M/S
POTASSIUM SERPL-SCNC: 4 MMOL/L (ref 3.5–5.1)
PROT SERPL-MCNC: 6.5 G/DL (ref 6–8.4)
PV MV: 0.47 M/S
PV PEAK GRADIENT: 1 MMHG
PV PEAK VELOCITY: 0.57 M/S
RA MAJOR: 4.04 CM
RA WIDTH: 2.89 CM
RIGHT VENTRICULAR END-DIASTOLIC DIMENSION: 3.11 CM
RIGHT VENTRICULAR LENGTH IN DIASTOLE (APICAL 4-CHAMBER VIEW): 7.46 CM
RV MID DIAMA: 1.68 CM
SINUS: 3.13 CM
SODIUM SERPL-SCNC: 138 MMOL/L (ref 136–145)
STJ: 2.92 CM
TDI SEPTAL: 0.07 M/S
TR MAX PG: 10 MMHG
TRICUSPID ANNULAR PLANE SYSTOLIC EXCURSION: 1.9 CM
TRICUSPID VALVE PEAK A WAVE VELOCITY: 0.24 M/S
TV PEAK E VEL: 0.5 M/S
Z-SCORE OF LEFT VENTRICULAR DIMENSION IN END DIASTOLE: -1.41
Z-SCORE OF LEFT VENTRICULAR DIMENSION IN END SYSTOLE: 2.34

## 2024-05-29 PROCEDURE — 99900035 HC TECH TIME PER 15 MIN (STAT)

## 2024-05-29 PROCEDURE — 36415 COLL VENOUS BLD VENIPUNCTURE: CPT | Performed by: INTERNAL MEDICINE

## 2024-05-29 PROCEDURE — 99239 HOSP IP/OBS DSCHRG MGMT >30: CPT | Mod: ,,, | Performed by: INTERNAL MEDICINE

## 2024-05-29 PROCEDURE — 83735 ASSAY OF MAGNESIUM: CPT | Performed by: INTERNAL MEDICINE

## 2024-05-29 PROCEDURE — 25000003 PHARM REV CODE 250: Performed by: INTERNAL MEDICINE

## 2024-05-29 PROCEDURE — 80053 COMPREHEN METABOLIC PANEL: CPT | Performed by: INTERNAL MEDICINE

## 2024-05-29 PROCEDURE — 84100 ASSAY OF PHOSPHORUS: CPT | Performed by: INTERNAL MEDICINE

## 2024-05-29 PROCEDURE — 94761 N-INVAS EAR/PLS OXIMETRY MLT: CPT

## 2024-05-29 PROCEDURE — 63600175 PHARM REV CODE 636 W HCPCS: Performed by: INTERNAL MEDICINE

## 2024-05-29 RX ORDER — CARVEDILOL 3.12 MG/1
6.25 TABLET ORAL 2 TIMES DAILY
Status: DISCONTINUED | OUTPATIENT
Start: 2024-05-29 | End: 2024-05-29 | Stop reason: HOSPADM

## 2024-05-29 RX ORDER — CIPROFLOXACIN 500 MG/1
500 TABLET ORAL 2 TIMES DAILY
Qty: 20 TABLET | Refills: 0 | Status: SHIPPED | OUTPATIENT
Start: 2024-05-29 | End: 2024-06-08

## 2024-05-29 RX ORDER — FUROSEMIDE 40 MG/1
40 TABLET ORAL DAILY
Qty: 30 TABLET | Refills: 11 | Status: SHIPPED | OUTPATIENT
Start: 2024-05-29 | End: 2025-05-29

## 2024-05-29 RX ORDER — CARVEDILOL 3.12 MG/1
3.12 TABLET ORAL 2 TIMES DAILY
Qty: 60 TABLET | Refills: 11 | Status: SHIPPED | OUTPATIENT
Start: 2024-05-29 | End: 2025-05-29

## 2024-05-29 RX ORDER — HYDROCODONE BITARTRATE AND ACETAMINOPHEN 10; 325 MG/1; MG/1
1 TABLET ORAL EVERY 8 HOURS PRN
Qty: 10 TABLET | Refills: 0 | Status: SHIPPED | OUTPATIENT
Start: 2024-05-29

## 2024-05-29 RX ORDER — LISINOPRIL 2.5 MG/1
2.5 TABLET ORAL DAILY
Qty: 90 TABLET | Refills: 3 | Status: SHIPPED | OUTPATIENT
Start: 2024-05-29 | End: 2025-05-29

## 2024-05-29 RX ORDER — METRONIDAZOLE 500 MG/1
500 TABLET ORAL 3 TIMES DAILY
Qty: 21 TABLET | Refills: 0 | Status: SHIPPED | OUTPATIENT
Start: 2024-05-29 | End: 2024-06-05

## 2024-05-29 RX ADMIN — LISINOPRIL 2.5 MG: 2.5 TABLET ORAL at 09:05

## 2024-05-29 RX ADMIN — TAMSULOSIN HYDROCHLORIDE 0.4 MG: 0.4 CAPSULE ORAL at 09:05

## 2024-05-29 RX ADMIN — HEPARIN SODIUM 5000 UNITS: 5000 INJECTION INTRAVENOUS; SUBCUTANEOUS at 06:05

## 2024-05-29 RX ADMIN — CARVEDILOL 3.12 MG: 3.12 TABLET, FILM COATED ORAL at 09:05

## 2024-05-29 RX ADMIN — METRONIDAZOLE 500 MG: 500 INJECTION, SOLUTION INTRAVENOUS at 02:05

## 2024-05-29 RX ADMIN — POLYETHYLENE GLYCOL (3350) 17 G: 17 POWDER, FOR SOLUTION ORAL at 09:05

## 2024-05-29 RX ADMIN — SUCRALFATE 1 G: 1 TABLET ORAL at 12:05

## 2024-05-29 RX ADMIN — CIPROFLOXACIN 400 MG: 400 INJECTION, SOLUTION INTRAVENOUS at 06:05

## 2024-05-29 RX ADMIN — ASPIRIN 81 MG: 81 TABLET, CHEWABLE ORAL at 09:05

## 2024-05-29 RX ADMIN — METRONIDAZOLE 500 MG: 500 INJECTION, SOLUTION INTRAVENOUS at 09:05

## 2024-05-29 RX ADMIN — SUCRALFATE 1 G: 1 TABLET ORAL at 09:05

## 2024-05-29 NOTE — NURSING
D/C instructions provided and explained to pt and pt's mother, printed prescriptions provided, understanding of D/C instructions verbalized. IV removed, catheter intact. Tolerated well. Telemetry monitor removed and returned to monitor room. VSS. Pt denies complaints. Ambulatory off unit with all belongings in hand.

## 2024-05-29 NOTE — CONSULTS
CARDIOLOGY CONSULTATION    Patient Name:  Yeison Manuel    :  1970    Medical Record:  [id number]    DATE OF SERVICE: 2024    ATTENDING PHYSICIAN: Teofilo Gray MD    REASON FOR CONSULT:   Cardiomyopathy clearance for gallbladder surgery  HISTORY OF PRESENT ILLNESS  The patient is a 53 y.o. y/o male who is hospitalized for Acute cholecystitis.  Increasing abdominal pain abdominal ultrasound sludge and mild gallbladder thickening with positive Anderson sign concern for acute cholecystitis.  Chronic HFrEF EF 12% previous 20%  patient has a history of substance abuse methamphetamine states last use 1 week ago.  Patient states he has not had any cardiac evaluation for his cardiomyopathy.  He was to have a stress done  but did not have done.  History of hyperlipidemia.  Cardiology consult clearance for surgery      PAST MEDICAL HISTORY  Past Medical History:   Diagnosis Date    Renal disorder        PAST SURGICAL HISTORY  No past surgical history on file.    SOCIAL HISTORY   reports that he has been smoking. He has never used smokeless tobacco. He reports that he does not currently use alcohol. He reports that he does not currently use drugs after having used the following drugs: Cocaine and Methamphetamines.      FAMILY HISTORY  No family history on file.      ALLERGIES   Review of patient's allergies indicates:   Allergen Reactions    Penicillins Swelling    Codeine Nausea Only       HOME  MEDICATIONS  Prior to Admission medications    Medication Sig Start Date End Date Taking? Authorizing Provider   aspirin 81 MG Chew Take 1 tablet (81 mg total) by mouth once daily. 24  Arabella Jama MD   carvediloL (COREG) 3.125 MG tablet Take 1 tablet (3.125 mg total) by mouth 2 (two) times daily. 24  Arabella Jama MD   furosemide (LASIX) 40 MG tablet Take 1 tablet (40 mg total) by mouth once daily. 24  Arabella Jama MD   HYDROcodone-acetaminophen  (NORCO)  mg per tablet Take 1 tablet by mouth every 8 (eight) hours as needed for Pain. 4/3/22   Ashely Gamboa FNP   lisinopriL (PRINIVIL,ZESTRIL) 2.5 MG tablet Take 1 tablet (2.5 mg total) by mouth once daily. 1/31/24 1/30/25  Arabella Jama MD   sucralfate (CARAFATE) 1 gram tablet Take 1 tablet (1 g total) by mouth 4 (four) times daily. for 10 days 5/26/24 6/5/24  Jamel Mancia MD   tamsulosin (FLOMAX) 0.4 mg Cap Take 1 capsule (0.4 mg total) by mouth once daily. 4/3/22   Ashely Gamboa FNP     CURRENT MEDICATIONS   aspirin  81 mg Oral Daily    carvediloL  3.125 mg Oral BID    ciprofloxacin  400 mg Intravenous Q12H    heparin (porcine)  5,000 Units Subcutaneous Q8H    lisinopriL  2.5 mg Oral Daily    metronidazole  500 mg Intravenous Q8H    polyethylene glycol  17 g Oral Daily    sucralfate  1 g Oral QID    tamsulosin  0.4 mg Oral Daily       Current Facility-Administered Medications:     acetaminophen, 650 mg, Oral, Q4H PRN    albuterol sulfate, 2.5 mg, Nebulization, Q4H PRN    aluminum-magnesium hydroxide-simethicone, 30 mL, Oral, QID PRN    glucagon (human recombinant), 1 mg, Intramuscular, PRN    HYDROcodone-acetaminophen, 1 tablet, Oral, Q8H PRN    melatonin, 6 mg, Oral, Nightly PRN    morphine, 2 mg, Intravenous, Q4H PRN    naloxone, 0.02 mg, Intravenous, PRN    ondansetron, 4 mg, Intravenous, Q8H PRN    prochlorperazine, 5 mg, Intravenous, Q6H PRN    simethicone, 1 tablet, Oral, QID PRN      REVIEW OF SYSTEMS  General: no weight loss, no fever, no chills, no anorexia  Skin: no rash  Eyes: no blurry vision  Ears/Nose/Throat: no nasal congestion, no sore throat  Respiratory: no cough, + occdyspnea, no wheezing  Cardiovascular: no chest pain, no ankle swelling  Gastrointestinal: no nausea, no vomiting, no diarrhea, no constipation, + abdominal pain. No melena, no bright red blood per rectum  Genitourinary: no dysuria, no hematuria  Musculoskeletal: no joint pain, no myalgia, no muscle  "weakness  Neurologic: no seizures, no tremors, no fainting  Hematologic/Lymphatic: no abnormal bleeding, no abnormal bruising  Endocrine: no heat or cold intolerance, no polydipsia, no polyuria  Psychiatric: no anxiety, no depression  Allergy/Immunology: no seasonal allergies, no joint stiffness in morning      PHYSICAL EXAM  Vital Signs:  Temp:  [97.4 °F (36.3 °C)-98.4 °F (36.9 °C)] 97.7 °F (36.5 °C)  Pulse:  [80-95] 95  Resp:  [16-18] 16  SpO2:  [94 %-99 %] 98 %  BP: (109-128)/(76-89) 128/80  Constitutional:  Healthy, no distress  HENT:  Normocephalic, Atraumatic, Bilateral external ears normal, Oropharynx moist, No oral exudates, No tenderness, neck supple.  Eyes:  PERRL, EOMI, Conjunctiva normal, No discharge.   Lymphatic:  No cervical or supraclavicular lymphadenopathy noted.   Cardiovascular:  Normal heart rate, Normal rhythm, No murmurs, No rubs, No gallops.   Respiratory:  Normal breath sounds, No respiratory distress, No wheezing, No rhonchi, No rales, No chest tenderness.   GI:  Bowel sounds normal, Soft, No tenderness, No rebound or guarding, No masses.   Integument:  Warm, Dry, No erythema, No rash.   Musculoskeletal/Extremities:  Intact distal pulses, No edema, No tenderness, No cyanosis, No clubbing. Good range of motion in all major joints.   Neurologic:  Alert & oriented x 3, cranial nerves grossly intact, No focal deficits.    LABS    CBC  Recent Labs   Lab 05/25/24 2103 05/27/24  1027   WBC 12.65 14.81*   RBC 6.75* 5.98   HGB 20.3* 18.3*   HCT 55.4* 49.1   MCV 82 82   MCH 30.1 30.6   MCHC 36.6* 37.3*   RDW 15.4* 14.3   MPV 9.2 9.8    342       Chemistry  Recent Labs   Lab 05/25/24 2103 05/27/24  1027 05/28/24  0514    138 135*   K 3.6 3.6 4.2    101 104   CO2 26 21* 22*   BUN 15 13 13   CREATININE 1.3 1.1 0.9   * 114* 93   PROT 8.6* 7.5 6.4   CALCIUM 11.0* 9.7 9.0   BILITOT 1.5* 1.0 1.1*   AST 21 20 20   ALT 21 16 15       ABG  No results for input(s): "PHART", " ""SKD1MFM", "PO2ART", "UMJ1TML", "O1IDPUCY", "BEART", "S1MJZFUF" in the last 168 hours.    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       PLAN    Acute cholecystitis  Decreased abdominal pain  Abdominal ultrasound thickened gallbladder sludge and gallbladder polyps or Anderson's 9  Followed by surgery    Chronic HFrEF  Occasional shortness of breath    Sinus tachycardia no ischemic changes by EKG   Troponin 0.089  EF 12% 5/24  EF 20% 1/24  Lisinopril 2.5 mg daily  Coreg 3.125 mg b.i.d.  Off Aldactone concern for gynecomastia by CT    Substance abuse  Methamphetamine use  States last use 1 week ago    Hyperlipidemia    Plan  Patient is increased risk for planned surgery secondary to EF of 12% and no evaluation for obstructive coronary disease  Suggest transfer to tertiary care facility if surgery is planned that can treat  surgery with reduced EF  +cardiac evaluate  Lipid level in a.m.  Discussed with Dr. Gray    The plan was discussed with the patient and/or family.    Thank you for the Consult.    Electronically signed by: Quang Wagner, 5/28/2024 7:05 PM     Time spent on counseling/coordination of care:   Total time spent with patient:   "

## 2024-05-29 NOTE — HOSPITAL COURSE
Patient is a 53-year-old male that presented with right upper quadrant abdominal pain.  Patient was found to have a mild cholecystitis in the emergency department.  Patient was then seen by Dr. Taylor our general surgeon.  Plan was for a laparoscopic-assisted cholecystectomy.  I was then asked by Dr. Taylor to evaluate this patient for perioperative cardiovascular risk.  Echocardiogram showed a severe cardiomyopathy with ejection fraction a proximally 15%.  Based on this the surgery was held and patient will need a cardiovascular evaluation prior to any elective surgery.  Review of his x-rays by Dr. Taylor, general surgery showed that this patient had some improvement and may not need a surgical procedure at this time.  Patient will be referred to Cardiology on discharge.  Patient was started on beta-blocker, ACE inhibitor, aspirin, and we will continue on his current medications otherwise.  Patient was cautioned against using any illicit drugs due to his poor cardiac condition.  Patient also has been lost to follow up with previous cardiovascular appointments.  Patient was also cautioned to attend the follow up appointments with Cardiology that we arrange.  Patient will also be seen by primary care physician within 1 week post discharge.  Patient was otherwise stable at the time of discharge with chemistry revealing no abnormalities.

## 2024-05-29 NOTE — DISCHARGE SUMMARY
[facility]  Discharge Summary      Patient Name: Yeisno Manuel  MRN: [id number]  SUBHA: [id number]  Patient Class: IP- Inpatient  Admission Date: 5/27/2024  Hospital Length of Stay: 2 days  Discharge Date and Time:  05/29/2024 3:01 PM  Attending Physician: Teofilo Gray MD   Discharging Provider: Teofilo Gray MD  Primary Care Provider: Marge Piper FNP    Primary Care Team: Networked reference to record PCT     HPI:        Chief Complaint   Patient presents with    Abdominal Pain       RUQ pain      EMS to ED alone. Patient complains of epigastric pain with vomiting onset 05/24/2024. Treated at the ED 05/25, 05/26, 05/27. Denies chest pain. Complains of shortness of breath. Denies fever or diarrhea. Decreased PO. Denies URI. Denies swelling. History of CHF with EF of approximately 20% per echo report dated 01/26/2024. Hx of meth use, last use per patient was about 1 week ago. At arrival, patient is uncomfortable, short of breath, moaning in exam bed. Per EMS, given aspirin 324 and Ntg X 4 doses PTA. Denies tobacco use.  @1240, states has Meth use was 05/23/2024. Last PO was 05/25/2024, he has not been able to eat or drink due to pain and vomiting.  Patient with a past medical history of congestive heart failure, hepatitis-C, illicit drug use, hyperlipidemia, and COPD.  Patient states that he has never seen a cardiologist but does see a primary care physician in [facility].  Patient has been placed on Lasix due to congestive heart failure.  Previous echo reveals 15-20% ejection fraction, moderate mitral regurgitation, in global hypokinesis.           The history is provided by the patient and the EMS personnel.           Review of patient's allergies indicates:   Allergen Reactions    Penicillins Swelling    Codeine Nausea Only       Left Ventricle: The left ventricle is moderately dilated. Moderately increased wall thickness. Severe global hypokinesis present. There is  severely reduced systolic function with a visually estimated ejection fraction of 15 - 20%. Ejection fraction by visual approximation is 20%. Global longitudinal strain is -4%. Severly reduced. There is diastolic dysfunction.    Right Ventricle: Normal right ventricular cavity size. Systolic function is severely reduced. TAPSE is 1.40 cm.    Left Atrium: Left atrium is moderately dilated.    Right Atrium: Right atrium is mildly dilated.    Aortic Valve: The aortic valve is a trileaflet valve.    Mitral Valve: There is moderate to severe regurgitation.    IVC/SVC: Normal venous pressure at 3 mmHg.    Pericardium: There is a small effusion. No indication of cardiac tamponade.    Procedure(s) (LRB):  CHOLECYSTECTOMY, LAPAROSCOPIC, WITH CHOLANGIOGRAM (N/A)      Hospital Course:   Patient is a 53-year-old male that presented with right upper quadrant abdominal pain.  Patient was found to have a mild cholecystitis in the emergency department.  Patient was then seen by Dr. Taylor our general surgeon.  Plan was for a laparoscopic-assisted cholecystectomy.  I was then asked by Dr. Taylor to evaluate this patient for perioperative cardiovascular risk.  Echocardiogram showed a severe cardiomyopathy with ejection fraction a proximally 15%.  Based on this the surgery was held and patient will need a cardiovascular evaluation prior to any elective surgery.  Review of his x-rays by Dr. Taylor, general surgery showed that this patient had some improvement and may not need a surgical procedure at this time.  Patient will be referred to Cardiology on discharge.  Patient was started on beta-blocker, ACE inhibitor, aspirin, and we will continue on his current medications otherwise.  Patient was cautioned against using any illicit drugs due to his poor cardiac condition.  Patient also has been lost to follow up with previous cardiovascular appointments.  Patient was also cautioned to attend the follow up appointments with Cardiology that  we arrange.  Patient will also be seen by primary care physician within 1 week post discharge.  Patient was otherwise stable at the time of discharge with chemistry revealing no abnormalities.     Goals of Care Treatment Preferences:  Code Status: Full Code      Consults:   Consults (From admission, onward)          Status Ordering Provider     Inpatient consult to Cardiology  Once        Provider:  Quang Wagner MD    Completed LAURA LAMB     Case Management/  Once        Provider:  (Not yet assigned)    Acknowledged LAURA LAMB            No new Assessment & Plan notes have been filed under this hospital service since the last note was generated.  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    Why: Pt's cardiologist not responding after multiple attempts. Please tell Pt to call and set up a follow up cardiology appt with his doctor. Dr. Dueñas.  Contact information:  [address] [place] [address], [place] [address]    [phone]             Marge Piper FNP. Go on 6/4/2024.    Specialty: Family Medicine  Why: Hospital follow up appt made for 6/4/24 at 10:20  Contact information:  [address] [place] [address] [id number] [phone]                           Patient Instructions:      Ambulatory referral/consult to Cardiology   Standing Status: Future   Referral Priority: Routine Referral Type: Consultation   Referral Reason: Specialty Services Required    Requested Specialty: Cardiology   Number of Visits Requested: 1     Diet Cardiac     Notify your health care provider if you experience any of the following:  temperature >100.4     Notify your health care provider if you experience any of the following:  persistent nausea and vomiting or diarrhea     Notify your health care provider if you experience any of the following:  severe uncontrolled pain     Notify your health care provider if you experience any of the following:  redness, tenderness, or signs of infection (pain, swelling, redness, odor or green/yellow discharge around incision site)     Notify your health care provider if you experience any of the following:  difficulty breathing or increased cough     Activity as tolerated       Significant Diagnostic Studies: Labs: All labs within the past 24 hours have been reviewed    Pending Diagnostic Studies:       Procedure Component Value Units Date/Time    EKG 12-lead [[id number]]     Order Status: Sent Lab Status: No result            Medications:  Reconciled Home Medications:      Medication List        START taking these medications      ciprofloxacin HCl 500 MG tablet  Commonly known as: CIPRO  Take 1 tablet (500 mg total) by mouth 2 (two) times daily. for 10 days     metroNIDAZOLE 500 MG tablet  Commonly known as: FLAGYL  Take 1 tablet (500 mg total) by mouth 3 (three) times daily. for 7 days            CONTINUE taking these medications      aspirin 81 MG Chew  Take 1 tablet (81 mg total) by mouth once daily.     carvediloL 3.125 MG tablet  Commonly known as: COREG  Take 1 tablet (3.125 mg total) by mouth 2 (two) times daily.     furosemide 40 MG tablet  Commonly known as: LASIX  Take 1 tablet (40 mg total) by mouth once daily.     HYDROcodone-acetaminophen  mg per tablet  Commonly known as: NORCO  Take 1 tablet by mouth every 8 (eight) hours as needed for Pain.     lisinopriL 2.5 MG tablet  Commonly known as: PRINIVIL,ZESTRIL  Take 1 tablet (2.5  mg total) by mouth once daily.     sucralfate 1 gram tablet  Commonly known as: CARAFATE  Take 1 tablet (1 g total) by mouth 4 (four) times daily. for 10 days     tamsulosin 0.4 mg Cap  Commonly known as: FLOMAX  Take 1 capsule (0.4 mg total) by mouth once daily.              Indwelling Lines/Drains at time of discharge:   Lines/Drains/Airways       None                   Time spent on the discharge of patient: 35 minutes         Teofilo Gray MD  Department of Hospital Medicine  [facility]

## 2024-05-29 NOTE — PROGRESS NOTES
"CARDIOLOGY PROGRESS NOTE    Patient Name:  Yeison Manuel    :  1970    Medical Record:  [id number]    DATE OF SERVICE  2024        SUBJECTIVE  The patient is being seen for follow-up hospitalized for Acute cholecystitis.  Increasing abdominal pain abdominal ultrasound sludge and mild gallbladder thickening with positive Anderson sign concern for acute cholecystitis no further abdominal pain..  Chronic HFrEF EF 12% previous 20%  patient has a history of substance abuse methamphetamine states last use 1 week ago.  Patient states he has not had any cardiac evaluation for his cardiomyopathy.  He was to have a stress done  but did not have done.  History of hyperlipidemia.  .  Sinus tachycardia 103      REVIEW OF SYSTEMS  General: No chills, No fever, No fatigue  Eyes: No vision changes, No drainage from eyes  ENT: No nasal congestion, no sore throat  Respiratory: No shortness of breath, No cough  Cardiac: No chest pain, palpitations, dyspnea, dizziness, claudication, or syncopal episodes  GI: No abdominal pain, no nausea, no vomiting  : No dysuria  Musculoskeletal: No joint pain  Neuro: No weakness, dizziness, vision changes       OBJECTIVE          PHYSICAL EXAM  Vital Signs:  /84   Pulse 101   Temp 98.3 °F (36.8 °C) (Tympanic)   Resp 17   Ht 6' 2" (1.88 m)   Wt 87.6 kg (193 lb 2 oz)   SpO2 97%   BMI 24.80 kg/m²   Temp:  [96 °F (35.6 °C)-98.3 °F (36.8 °C)] 98.3 °F (36.8 °C)  Pulse:  [] 101  Resp:  [15-18] 17  SpO2:  [93 %-98 %] 97 %  BP: (124-150)/(75-86) 132/84      General:   Eyes: Anicteric sclera. Moist conjunctiva. Vision grossly intact.  HENMT: Normocephalic.  Moist mucous membranes. No obvious ulcerations.   Neck: Trachea midline.   Cardiovascular: Heart regular rate and rhythm. S1, S2, S3 2/6 systolic ejection murmur left sternal border  Peripheral pulses palpable. No peripheral edema.   Respiratory: Lungs clear to auscultation bilaterally. No increased work of " "breathing.  Gastrointestinal: Bowel sounds active in all 4 quadrants. Soft, nontender, nondistended.   Genitourinary: Urine clear yellow  Musculoskeletal: Moves all extremities with equal strength.   Skin: Color normal for ethnicity. Skin warm and dry with good turgor. Capillary refill < 3 seconds.   Neurologic: Oriented x 3.  Speech fluent, follows commands.  Psychiatric:  Awake and alert, normal affect mood good.      LABS    CBC  Recent Labs   Lab 05/25/24  2103 05/27/24  1027   WBC 12.65 14.81*   RBC 6.75* 5.98   HGB 20.3* 18.3*   HCT 55.4* 49.1   MCV 82 82   MCH 30.1 30.6   MCHC 36.6* 37.3*   RDW 15.4* 14.3   MPV 9.2 9.8    342       Chemistry  Recent Labs   Lab 05/27/24  1027 05/28/24  0514 05/29/24  0452    135* 138   K 3.6 4.2 4.0    104 104   CO2 21* 22* 23   BUN 13 13 12   CREATININE 1.1 0.9 0.9   * 93 85   PROT 7.5 6.4 6.5   CALCIUM 9.7 9.0 9.0   BILITOT 1.0 1.1* 0.6   AST 20 20 30   ALT 16 15 26       ABG  No results for input(s): "PHART", "BWL4JOY", "PO2ART", "MRM1QTV", "N9PFEDDJ", "BEART", "J7JMQSTX" in the last 168 hours.      MICROBIOLOGY  Reviewed    IMAGING & OTHER STUDIES  Reviewed      Intake/Output last 3 shifts:  I/O last 3 completed shifts:  In: 1970 [P.O.:870; IV Piggyback:1100]  Out: 4325 [Urine:4325]    Scheduled meds:   aspirin  81 mg Oral Daily    carvediloL  3.125 mg Oral BID    ciprofloxacin  400 mg Intravenous Q12H    heparin (porcine)  5,000 Units Subcutaneous Q8H    lisinopriL  2.5 mg Oral Daily    metronidazole  500 mg Intravenous Q8H    polyethylene glycol  17 g Oral Daily    sucralfate  1 g Oral QID    tamsulosin  0.4 mg Oral Daily       PRN Meds:    Current Facility-Administered Medications:     acetaminophen, 650 mg, Oral, Q4H PRN    albuterol sulfate, 2.5 mg, Nebulization, Q4H PRN    aluminum-magnesium hydroxide-simethicone, 30 mL, Oral, QID PRN    glucagon (human recombinant), 1 mg, Intramuscular, PRN    HYDROcodone-acetaminophen, 1 tablet, Oral, Q8H " PRN    melatonin, 6 mg, Oral, Nightly PRN    morphine, 2 mg, Intravenous, Q4H PRN    naloxone, 0.02 mg, Intravenous, PRN    ondansetron, 4 mg, Intravenous, Q8H PRN    prochlorperazine, 5 mg, Intravenous, Q6H PRN    simethicone, 1 tablet, Oral, QID PRN    Continuous Infusions:      Dietary Orders:  [unfilled]     Admit weight: Weight: 83.9 kg (185 lb)   Today weight: Weight: 87.6 kg (193 lb 2 oz)      Length of stay in days: 2      ASSESSMENT    Active Hospital Problems    Diagnosis  POA    *Acute cholecystitis [K81.0]  Yes    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          PLAN    Acute cholecystitis  Decreased abdominal pain  Abdominal ultrasound thickened gallbladder sludge and gallbladder polyps or Anderson's 9  Followed by surgery     Chronic HFrEF  Occasional shortness of breath    Sinus tachycardia no ischemic changes by EKG   Troponin 0.089  EF 12% 5/24  EF 20% 1/24  Lisinopril 2.5 mg daily  Coreg 3.125 mg b.i.d.  Off Aldactone concern for gynecomastia by CT     Substance abuse  Methamphetamine use  States last use 1 week ago     Hyperlipidemia     Plan  No further abdominal pain  Surgery not done  Sinus tachycardia 103  Increased Coreg 6.25 mg b.i.d.  For discharge today  Patient will be following up with Dr. Dueñas   Discussed with Dr. Gray       Electronically signed by: Quang Wagner, 5/29/2024 12:36 PM

## 2024-05-29 NOTE — PLAN OF CARE
Patient cleared for discharge from case management standpoint.    Follow up appointments scheduled and added to AVS.    Chart and discharge orders reviewed.  Patient discharged home with no further case management needs.       05/29/24 1456   Final Note   Assessment Type Final Discharge Note   Anticipated Discharge Disposition Home   What phone number can be called within the next 1-3 days to see how you are doing after discharge?   ([phone])   Hospital Resources/Appts/Education Provided Appointments scheduled and added to AVS   Post-Acute Status   Discharge Delays None known at this time